# Patient Record
Sex: FEMALE | Race: WHITE
[De-identification: names, ages, dates, MRNs, and addresses within clinical notes are randomized per-mention and may not be internally consistent; named-entity substitution may affect disease eponyms.]

---

## 2022-07-26 ENCOUNTER — HOSPITAL ENCOUNTER (EMERGENCY)
Dept: HOSPITAL 97 - ER | Age: 42
Discharge: HOME | End: 2022-07-26
Payer: COMMERCIAL

## 2022-07-26 VITALS — OXYGEN SATURATION: 100 % | DIASTOLIC BLOOD PRESSURE: 70 MMHG | SYSTOLIC BLOOD PRESSURE: 117 MMHG

## 2022-07-26 VITALS — TEMPERATURE: 98.1 F

## 2022-07-26 DIAGNOSIS — S93.401A: Primary | ICD-10-CM

## 2022-07-26 DIAGNOSIS — I10: ICD-10-CM

## 2022-07-26 DIAGNOSIS — Z88.3: ICD-10-CM

## 2022-07-26 PROCEDURE — 99284 EMERGENCY DEPT VISIT MOD MDM: CPT

## 2022-07-26 PROCEDURE — 2W3QX1Z IMMOBILIZATION OF RIGHT LOWER LEG USING SPLINT: ICD-10-PCS

## 2022-07-26 NOTE — ER
Nurse's Notes                                                                                     

 [facility]                                                                 

Name: Karlie Zhou                                                                                

Age: 41 yrs                                                                                       

Sex: Female                                                                                       

: 1980                                                                                   

MRN: [id number]                                                                                   

Arrival Date: 2022                                                                          

Time: 14:07                                                                                       

Account#: [id number]                                                                            

Bed 9                                                                                             

Private MD:                                                                                       

Diagnosis: Sprain of unspecified ligament of right ankle                                          

                                                                                                  

Presentation:                                                                                     

                                                                                             

14:18 Chief complaint: Right ankle pain that radiates to calf since last night. Coronavirus   hb  

      screen: At this time, the client does not indicate any symptoms associated with             

      coronavirus-19. Ebola Screen: No symptoms or risks identified at this time. Initial         

      Sepsis Screen: Does the patient meet any 2 criteria? No. Patient's initial sepsis           

      screen is negative. Does the patient have a suspected source of infection? No.              

      Patient's initial sepsis screen is negative. Risk Assessment: Do you want to hurt           

      yourself or someone else? Patient reports no desire to harm self or others. Onset of        

      symptoms was 2022.                                                                 

14:18 Method Of Arrival: Ambulatory                                                           hb  

14:18 Acuity: ABELINO 4                                                                           hb  

                                                                                                  

Triage Assessment:                                                                                

14:20 General: Appears in no apparent distress. Behavior is calm, cooperative. Pain:. Neuro:  hb  

      Level of Consciousness is awake, alert, obeys commands, Oriented to person, place,          

      time, situation. Cardiovascular: Patient's skin is warm and dry. Respiratory:               

      Respiratory effort is even, unlabored, Respiratory pattern is regular, symmetrical.         

14:28 Musculoskeletal: Range of motion: intact in all extremities, Swelling present in right  eh3 

      ankle.                                                                                      

                                                                                                  

OB/GYN:                                                                                           

14:20 LMP 2022                                                                           hb  

                                                                                                  

Historical:                                                                                       

- Allergies:                                                                                      

14:20 Erythromycin;                                                                           hb  

- PMHx:                                                                                           

14:20 Hypertensive disorder;                                                                  hb  

                                                                                                  

- Immunization history:: Adult Immunizations up to date.                                          

- Social history:: Smoking status: Patient denies any tobacco usage or history of.                

                                                                                                  

                                                                                                  

Screenin:34 Abuse screen: Denies threats or abuse. Denies injuries from another. Nutritional        eh3 

      screening: No deficits noted. Tuberculosis screening: No symptoms or risk factors           

      identified. Fall Risk Gait- Impaired (20 pts.).                                             

                                                                                                  

Assessment:                                                                                       

14:34 Reassessment: No changes from previously documented assessment. See triage assessment.  eh3 

      General: Appears in no apparent distress. uncomfortable, Behavior is calm, cooperative,     

      appropriate for age. Pain: Complains of pain in right Achilles, right heel and right        

      ankle Pain radiates to right calf Pain currently is 7 out of 10 on a pain scale.            

      Quality of pain is described as burning, radiating, sharp, shooting, tender, Pain began     

      at 0200 today Is continuous, Alleviated by rest, repositioning, Aggravated by increased     

      activity, weight bearing. Neuro: Level of Consciousness is awake, alert, obeys              

      commands, Oriented to person, place, time, situation. Cardiovascular: Capillary refill      

      < 3 seconds Patient's skin is warm and dry. Respiratory: Airway is patent Respiratory       

      effort is even, unlabored. GI: No signs and/or symptoms were reported involving the         

      gastrointestinal system. : No signs and/or symptoms were reported regarding the           

      genitourinary system. EENT: No signs and/or symptoms were reported regarding the EENT       

      system. Derm: No signs and/or symptoms reported regarding the dermatologic system.          

      Musculoskeletal: Capillary refill < 3 seconds, in bilateral toes. Range of motion:          

      intact in all extremities, Swelling present in right ankle.                                 

                                                                                                  

Vital Signs:                                                                                      

14:18  / 97; Pulse 105; Resp 18; Temp 98.1; Pulse Ox 100% on R/A; Weight 112.04 kg;     hb  

      Height 5 ft. 5 in. (165.10 cm); Pain 7/10;                                                  

14:48  / 88; Pulse 90; Resp 18; Pulse Ox 99% on R/A; Height 5 ft. 9 in. (175.26 cm);    eh3 

      Pain 6/10;                                                                                  

17:34  / 85; Pulse 86; Resp 18; Pulse Ox 99% on R/A;                                    ld1 

18:26  / 70; Pulse 99; Resp 18; Pulse Ox 100% ;                                         ld1 

14:48 Body Mass Index 36.48 (112.04 kg, 175.26 cm)                                            eh3 

                                                                                                  

ED Course:                                                                                        

14:07 Patient arrived in ED.                                                                  mr  

14:10 Dinh Drummond NP is PHCP.                                                           pm1 

14:10 Jaylen Montgomery MD is Attending Physician.                                             pm1 

14:20 Triage completed.                                                                       hb  

14:20 Arm band placed on.                                                                     hb  

14:28 Graciela Davis is Primary Nurse.                                                            eh3 

14:34 Patient has correct armband on for positive identification. Bed in low position. Call   eh3 

      light in reach. Side rails up X2. Door closed. Noise minimized. Lights dimmed. Pillow       

      given.                                                                                      

15:17 Ankle Right 3 View XRAY In Process Unspecified.                                         EDMS

17:12 Ankle Right Wo Cont In Process Unspecified.                                             EDMS

18:26 No provider procedures requiring assistance completed. Patient did not have IV access   ld1 

      during this emergency room visit. Orthoglass splint: stirrup splint applied on left leg.    

                                                                                                  

Administered Medications:                                                                         

14:23 Drug: Norco (HYDROcodone-acetaminophen) 10 mg-325 mg 1 tabs Route: PO;                  hb  

17:34 Follow up: Response: No adverse reaction                                                ld1 

                                                                                                  

                                                                                                  

Medication:                                                                                       

14:50 VIS not applicable for this client.                                                     eh3 

                                                                                                  

Outcome:                                                                                          

17:48 Discharge ordered by MD.                                                                pm1 

18:26 Discharged to home via wheelchair.                                                      ld1 

18:26 Condition: stable                                                                           

18:26 Condition: stable                                                                           

18:26 Discharge instructions given to patient, Instructed on discharge instructions, follow       

      up and referral plans. medication usage, Demonstrated understanding of instructions,        

      follow-up care, medications, Prescriptions given X 1.                                       

18:27 Patient left the ED.                                                                    ld1 

                                                                                                  

Signatures:                                                                                       

Dispatcher [employer]                           EDMS                                                 

BruceBessy donald                                 mr DrummondDinh, NP                    NP   pm1                                                  

Swetha Cochran, RN                     RN                                                      

Ana María Gray RN                     RN   ld1                                                  

Graciela Davis                                   3                                                  

                                                                                                  

Corrections: (The following items were deleted from the chart)                                    

14:50 14:28 Musculoskeletal: Range of motion: eh3                                             eh3 

                                                                                                  

**************************************************************************************************

## 2022-07-26 NOTE — RAD REPORT
EXAM DESCRIPTION:  RAD - Ankle Right 3 View - 7/26/2022 3:15 pm

 

CLINICAL HISTORY:  PAIN

 

COMPARISON:  No comparisons

 

FINDINGS:  No fracture, dislocation or periosteal reaction. No joint effusion seen. Tibiotalar joint 
space is narrowed slightly. There degenerative changes along the articular margins of the tibiotalar 
joint space. No subtalar abnormality identifiable. Patient has large plantar and Achilles tendon spur
s off of the calcaneus.

 

Soft tissues around the ankle are prominent. No foreign body or air in the soft tissues.

 

IMPRESSION:  Right ankle degenerative change as detailed. No acute bone or joint finding.

 

Ankle soft tissue swelling.

## 2022-07-26 NOTE — RAD REPORT
EXAM DESCRIPTION:  MRI - Ankle Right Wo Cont - 7/26/2022 5:12 pm

 

CLINICAL HISTORY:  achilles pain

 

COMPARISON:  Ankle Right 3 View dated 7/26/2022

 

TECHNIQUE:  Multi planer imaging of the ankle using T1 weighted, proton density, T2 fat saturation an
d T2 stir sequencing.

 

FINDINGS:  No occult fracture, bone bruise or marrow replacing process. Tibiotalar joint space is liza
rowed slightly with marginal spurring. Subtalar ligament and subtalar joint space unremarkable. There
 is minimal fluid in the posterior aspect of the talocalcaneal joint space.

 

Achilles tendon shows no partial-thickness or full-thickness tear. No thickening, signal abnormality 
or adjacent abnormal fluid collection. No plantar abnormality seen.

 

The anterior and posterior tibiofibular and talofibular ligaments appear to be intact. No abnormal ed
ema in the adjacent soft tissues. Calcaneofibular ligament is intact. No evidence for medial ligament
 complex injury.

 

Tendons of the ankle show normal size and signal intensity. No skeletal muscle abnormality. No suspic
ious edema in the soft tissues.

 

IMPRESSION:  MRI of the ankle shows no acute or suspicious finding.

## 2022-07-26 NOTE — EDPHYS
Physician Documentation                                                                           

 [facility]                                                                 

Name: Karlie Zhou                                                                                

Age: 41 yrs                                                                                       

Sex: Female                                                                                       

: 1980                                                                                   

MRN: [id number]                                                                                   

Arrival Date: 2022                                                                          

Time: 14:07                                                                                       

Account#: [id number]                                                                            

Bed 9                                                                                             

Private MD:                                                                                       

ED Physician Jaylen Montgomery                                                                      

HPI:                                                                                              

                                                                                             

14:21 This 41 yrs old Female presents to ER via Ambulatory with complaints of Ankle Injury.   pm1 

14:21 The patient presents with pain, that is acute. The complaints affect the right          pm1 

      Achilles. Onset: The symptoms/episode began/occurred last night. Context: The problem       

      was sustained at home, resulted from the patient tripping, over a pet, The patient can      

      partially bear weight on the affected extremity. the patient is able to ambulate, with      

      moderate difficulty.                                                                        

14:21 Associated signs and symptoms: Pertinent negatives: calf tenderness, numbness,          pm1 

      swelling, tingling. Modifying factors: The symptoms are alleviated by nothing, the          

      symptoms are aggravated by nothing. Severity of symptoms: in the emergency department       

      the symptoms are unchanged. The patient has experienced a previous episode, Similar to      

      her Achilles tendon injury to other leg. The patient has not recently seen a physician.     

                                                                                                  

OB/GYN:                                                                                           

14:20 LMP 2022                                                                           hb  

                                                                                                  

Historical:                                                                                       

- Allergies:                                                                                      

14:20 Erythromycin;                                                                           hb  

- PMHx:                                                                                           

14:20 Hypertensive disorder;                                                                  hb  

                                                                                                  

- Immunization history:: Adult Immunizations up to date.                                          

- Social history:: Smoking status: Patient denies any tobacco usage or history of.                

                                                                                                  

                                                                                                  

ROS:                                                                                              

14:21 Constitutional: Negative for fever, chills, and weight loss, Cardiovascular: Negative   pm1 

      for chest pain, palpitations, and edema, Respiratory: Negative for shortness of breath,     

      cough, wheezing, and pleuritic chest pain.                                                  

14:21 Skin: Negative for injury, rash, and discoloration, Neuro: Negative for headache,           

      weakness, numbness, tingling, and seizure.                                                  

14:21 MS/extremity: Positive for pain, of the right Achilles.                                     

14:21 All other systems are negative.                                                             

                                                                                                  

Exam:                                                                                             

14:21 Constitutional:  This is a well developed, well nourished patient who is awake, alert,  pm1 

      and in no acute distress. Head/Face:  Normocephalic, atraumatic.                            

14:21 Skin:  Warm, dry with normal turgor.  Normal color with no rashes, no lesions, and no       

      evidence of cellulitis.                                                                     

14:21 Cardiovascular: Exam negative for  acute changes, Rate: normal, Rhythm: regular,            

      Pulses: no pulse deficits are appreciated.                                                  

14:21 Respiratory: Exam negative for  acute changes, respiratory distress, shortness of           

      breath, Breath sounds: are clear throughout.                                                

14:21 Musculoskeletal/extremity: Extremities: grossly normal except: noted in the right           

      Achilles: tenderness, There is no evidence of  decreased ROM, Negative Whatley test,       

      ROM: full active range of motion, in the right ankle, full passive range of motion, in      

      the right ankle.                                                                            

14:21 Neuro: Exam negative for acute changes, Orientation: is normal, Mentation: is normal,       

      Motor: is normal, moves all fours.                                                          

                                                                                                  

Vital Signs:                                                                                      

14:18  / 97; Pulse 105; Resp 18; Temp 98.1; Pulse Ox 100% on R/A; Weight 112.04 kg;     hb  

      Height 5 ft. 5 in. (165.10 cm); Pain 7/10;                                                  

14:48  / 88; Pulse 90; Resp 18; Pulse Ox 99% on R/A; Height 5 ft. 9 in. (175.26 cm);    eh3 

      Pain 6/10;                                                                                  

17:34  / 85; Pulse 86; Resp 18; Pulse Ox 99% on R/A;                                    ld1 

18:26  / 70; Pulse 99; Resp 18; Pulse Ox 100% ;                                         ld1 

14:48 Body Mass Index 36.48 (112.04 kg, 175.26 cm)                                            eh3 

                                                                                                  

MDM:                                                                                              

14:23 Patient medically screened.                                                             pm1 

17:46 Data reviewed: vital signs. Data interpreted: Pulse oximetry: on room air is 99 %.      pm1 

      Interpretation: normal. Counseling: I had a detailed discussion with the patient and/or     

      guardian regarding: the historical points, exam findings, and any diagnostic results        

      supporting the discharge/admit diagnosis, radiology results, the need for outpatient        

      follow up, to return to the emergency department if symptoms worsen or persist or if        

      there are any questions or concerns that arise at home.                                     

                                                                                                  

                                                                                             

14:20 Order name: Ankle Right 3 View XRAY; Complete Time: 15:28                               pm1 

                                                                                             

14:21 Order name: Crutches; Complete Time: 14:53                                              pm1 

                                                                                             

15:36 Order name: Ankle Right Wo Cont; Complete Time: 17:46                                   EDMS

                                                                                             

17:46 Order name: Splint - Ankle: Orthoglass: Belgicap; Complete Time: 18:26                   pm1 

                                                                                                  

Administered Medications:                                                                         

14:23 Drug: Norco (HYDROcodone-acetaminophen) 10 mg-325 mg 1 tabs Route: PO;                  hb  

17:34 Follow up: Response: No adverse reaction                                                ld1 

                                                                                                  

                                                                                                  

Disposition Summary:                                                                              

22 17:48                                                                                    

Discharge Ordered                                                                                 

      Location: Home                                                                          pm1 

      Problem: new                                                                            pm1 

      Symptoms: have improved                                                                 pm1 

      Condition: Stable                                                                       pm1 

      Diagnosis                                                                                   

        - Sprain of unspecified ligament of right ankle                                       pm1 

      Followup:                                                                               pm1 

        - With: Emergency Department                                                               

        - When: As needed                                                                          

        - Reason: Worsening of condition                                                           

      Followup:                                                                               pm1 

        - With: Private Physician                                                                  

        - When: 2 - 3 days                                                                         

        - Reason: Recheck today's complaints, Continuance of care, Re-evaluation by your           

      physician                                                                                   

      Discharge Instructions:                                                                     

        - Discharge Summary Sheet                                                             pm1 

        - Ankle Sprain                                                                        pm1 

        - Cast or Splint Care, Adult                                                          pm1 

        - Crutch Use, Adult                                                                   pm1 

      Forms:                                                                                      

        - Medication Reconciliation Form                                                      pm1 

        - Thank You Letter                                                                    pm1 

        - Antibiotic Education                                                                pm1 

        - Prescription Opioid Use                                                             pm1 

      Prescriptions:                                                                              

        - Tylenol-Codeine #3 300 mg-30 mg Oral                                                     

            - take 2 tablet by ORAL route every 6 hours As needed; 20 tablet; Refills: 0,     pm1 

      Product Selection Permitted                                                                 

Signatures:                                                                                       

Dispatcher [employer]                           EDMS                                                 

Dinh Drummond, NP                    NP   pm1                                                  

Swetha Cochran RN                     RN                                                      

Ana María Gray RN   ld1                                                  

                                                                                                  

Corrections: (The following items were deleted from the chart)                                    

14:14 14:11 IV Saline Lock ordered. pm1                                                       pm1 

14:16 14:12 Extremity Venous Uni Ltd+US.RAD.BRZ ordered. EDMS                                 EDMS

14:16 14:12 Ankle Left 3 View+RAD.RAD.BRZ ordered. EDMS                                       EDMS

14:16 14:12 Knee Right 3 View+RAD.RAD.BRZ ordered. EDMS                                       EDMS

14:18 14:12 CBC+H.LAB.BRZ ordered. EDMS                                                       EDMS

14:18 14:12 COMPREHENSIVE METABOLIC PANEL+C.LAB.BRZ ordered. EDMS                             EDMS

                                                                                                  

**************************************************************************************************

## 2022-07-26 NOTE — XMS REPORT
Continuity of Care Document

                            Created on:2022



Patient:STEFFEN BUENO

Sex:Female

:1980

External Reference #:[id number]





Demographics







                          Address                   [address] [place] [address]

 

                          Home Phone                [phone]

 

                          Mobile Phone              [phone]

 

                          Email Address             [email or web address]

 

                          Preferred Language        English

 

                          Marital Status            Unknown

 

                          Samaritan Affiliation     Unknown

 

                          Race                      White

 

                          Additional Race(s)        Unavailable

 

                          Ethnic Group               or 









Author







                          Organization              [facility]

t

 

                          Address                   [address]



                                                    [place] [address]

 

                          Phone                     [phone]









Support







                Name            Relationship    Address         Phone

 

                MYLES            X               [address]  [place]

[place] [address] 









Care Team Providers







                    Name                Role                Phone

 

                    MARIFER BELLO            Primary Care Physician Unavailable

 

                    Sridhar  Attending Clinician Unavailable

 

                    Lui Milian     Attending Clinician [phone]

 

                    RADIOLOGY           Attending Clinician Unavailable

 

                    Radiology           Attending Clinician Unavailable

 

                    PALMA             Attending Clinician Unavailable

 

                    Palma FNP         Attending Clinician [phone]

 

                    YOUNG BASSETT      Attending Clinician Unavailable

 

                    Jessica PRETTY            Attending Clinician Unavailable

 

                    MILTON                Attending Clinician Unavailable

 

                    Only,  Db Test      Attending Clinician Unavailable

 

                    Milton MORRIS             Attending Clinician [phone]

 

                    Ebrahim FNP         Attending Clinician [phone]

 

                    EBRAHIM             Attending Clinician Unavailable

 

                    Ibikunmartir FNP,  F    Attending Clinician [phone]

 

                    IBIKUNLE,  F        Attending Clinician Unavailable

 

                    UNKNOWN             Attending Clinician Unavailable

 

                    ARNIE US       Attending Clinician Unavailable

 

                    Young Bassett DO   Attending Clinician [phone]

 

                    Doctor Unassigned,  Name Attending Clinician Unavailable

 

                    ANENE               Attending Clinician Unavailable

 

                    ARNIE Us MD    Attending Clinician [phone]

 

                    Green FNP           Attending Clinician [phone]

 

                    Unknown             Attending Clinician Unavailable

 

                    Chung MORRIS,  E      Attending Clinician [phone]

 

                    Sridhar  Admitting Clinician Unavailable

 

                    LUI MILIAN     Admitting Clinician Unavailable

 

                    IRENA,  PHILOMENA        Admitting Clinician Unavailable









Payers







           Payer Name Policy Type Policy Number Effective Date Expiration Date S

jules

 

           [place]            [id number] 2020            



                                            00:00:00              

 

           [employer]            [id number] 2020            



           HMO                              00:00:00              

 

           [employer] TRS CARE            [id number] 



                                            00:00:00   00:00:00   







Problems







       Condition Condition Condition Status Onset  Resolution Last   Treating Co

mments 

Source



       Name   Details Category        Date   Date   Treatment Clinician        



                                                 Date                 

 

       Hypertensi Hypertensi Disease Active 2021                             U

nivers



       on     on                                                  ity of



                                   00:00:                             [place]



                                                                    [facility]



                                                                      Branch

 

       Acquired Acquired Disease Active 2017                             Unive

rs



       hypothyroi hypothyroi               0                               it

y of



       dism   dism                 00:00:                             [place]



                                   00                                 Medical



                                                                      Branch

 

       Anemia, Anemia, Disease Active 2017                             [facility]



       unspecifie unspecifie                                              it

y of



       d type d type               00:00:                             [place]



                                   00                                 Medical



                                                                      [place]







Allergies, Adverse Reactions, Alerts







       Allergy Allergy Status Severity Reaction(s) Onset  Inactive Treating Comm

ents 

Source



       Name   Type                        Date   Date   Clinician        

 

       Erythrom Propensi Active        Nausea 2016                      Univer

s



       ycin   ty to                and/or 0-14                        ity of



              adverse               Vomiting 00:00:                      [place]



              reaction                      00                          Medical



              s                                                       Branch

 

       ERYTHROM DRUG   Active        N/V    2016                      [facility]



       YCIN                               0-14                        ity of



                                          00:00:                      [place]



                                          00                          [facility]







Social History







           Social Habit Start Date Stop Date  Quantity   Comments   Source

 

           Alcohol Comment                       social                [facility]

 

           Exposure to 2022 Not sure              [facility]

 [place]



           SARS-CoV-2 (event) 00:00:00   10:10:00                         [facility]

l Branch

 

           Alcohol intake 2022 0 /d                  [facility] [place]



                      00:00:00   00:00:00                         [facility]

 

           Tobacco use and 2020 Never used            [facility]



           exposure   00:00:00   00:00:00                         [facility]

 

           Sex Assigned At 1980                       [facility]



           Birth      00:00:00   00:00:00                         [facility]









                Smoking Status  Start Date      Stop Date       Source

 

                Never smoker                                    [facility]







Medications







       Ordered Filled Start  Stop   Current Ordering Indication Dosage Frequency

 Signature

                    Comments            Components          Source



     Medication Medication Date Date Medication? Clinician                (SIG) 

          



     Name Name                                                   

 

     benzonatate      2021      Yes       [id number] 200mg      Take 2         

  [facility]



     (TESSALON      2-28                               capsules           ity of



     PERLES) 100      00:00:                               by mouth           [facility]

[place]



     mg capsule      00                                 every 8           Medica

l



                                                  (eight)           Branch



                                                  hours as           



                                                  needed for           



                                                  Cough.           

 

     benzonatate      2021      Yes       [id number] 200mg      Take 2         

  [facility]



     (TESSALON      2-28                               capsules           ity of



     PERLES) 100      00:00:                               by mouth           Te

[place]



     mg capsule      00                                 every 8           Medica

l



                                                  (eight)           Branch



                                                  hours as           



                                                  needed for           



                                                  Cough.           

 

     lisinopriL      2021      Yes                                     [facility]



     5 mg tablet      2-21                                              ity of



               00:00:                                              [place]



               00                                                Medical



                                                                 Branch

 

     lisinopriL      2021      Yes                                     [facility]



     5 mg tablet      2-21                                              ity of



               00:00:                                              [place]



               00                                                Medical



                                                                 Branch

 

     CALCIUM      2021      Yes                      Take  by           Univer

s



     CARBONATE/V      0-22                               mouth.           ity of



     ITAMIN D2      18:13:                                              [place]



     (CALCIUM +      35                                                Medical



     VITAMIN D                                                        Branch



     ORAL)                                                        

 

     MULTIVITAMI      2021      Yes                      Take  by           Un

karla



     N ORAL      0-22                               mouth.           ity of



               18:13:                                              [address]



                                                                 Branch

 

     IRON,      2021      Yes                      Take  by           Univers



     FERROUS      0-22                               mouth.           ity of



     SULFATE,      18:13:                                              [place]



     ORAL      35                                                Medical



                                                                 Branch

 

     Cholecalcif      2021      Yes                      Take  by           Un

karla



     kamla,      0-22                               mouth.           ity of



     Vitamin D3,      18:13:                                              [place]



     (VITAMIN      35                                                Medical



     D3) 50 mcg                                                        Branch



     (2,000                                                        



     unit)                                                        



     capsule                                                        

 

     CALCIUM      2021      Yes                      Take  by           Univer

s



     CARBONATE/V      0-22                               mouth.           ity of



     ITAMIN D2      18:13:                                              [place]



     (CALCIUM +      35                                                Medical



     VITAMIN D                                                        Branch



     ORAL)                                                        

 

     MULTIVITAMI      2021      Yes                      Take  by           Un

karla



     N ORAL      0-22                               mouth.           ity of



               18:13:                                              [address]



                                                                 Branch

 

     IRON,      2021      Yes                      Take  by           Univers



     FERROUS      0-22                               mouth.           ity of



     SULFATE,      18:13:                                              [place]



     ORAL      35                                                Medical



                                                                 Branch

 

     Cholecalcif      2021      Yes                      Take  by           Un

karla



     kamla,      0-22                               mouth.           ity of



     Vitamin D3,      18:13:                                              [place]



     (VITAMIN      35                                                Medical



     D3) 50 mcg                                                        Branch



     (2,000                                                        



     unit)                                                        



     capsule                                                        

 

     CALCIUM      2021      Yes                      Take  by           Univer

s



     CARBONATE/V      0-22                               mouth.           ity of



     ITAMIN D2      18:13:                                              [place]



     (CALCIUM +      35                                                Medical



     VITAMIN D                                                        Branch



     ORAL)                                                        

 

     MULTIVITAMI      2021      Yes                      Take  by           Un

karla



     N ORAL      0-22                               mouth.           ity of



               18:13:                                              [place]



               35                                                Medical



                                                                 Branch

 

     IRON,      2021      Yes                      Take  by           Univers



     FERROUS      0-22                               mouth.           ity of



     SULFATE,      18:13:                                              [place]



     ORAL      35                                                Medical



                                                                 Branch

 

     Cholecalcif      2021      Yes                      Take  by           Un

karla



     kamla,      0-22                               mouth.           ity of



     Vitamin D3,      18:13:                                              [place]



     (VITAMIN      35                                                Medical



     D3) 50 mcg                                                        Branch



     (2,000                                                        



     unit)                                                        



     capsule                                                        

 

     CALCIUM      2021      Yes                      Take  by           Univer

s



     CARBONATE/V      0-22                               mouth.           ity of



     ITAMIN D2      18:13:                                              [place]



     (CALCIUM +      35                                                Medical



     VITAMIN D                                                        Branch



     ORAL)                                                        

 

     MULTIVITAMI      2021      Yes                      Take  by           Un

karla



     N ORAL      0-22                               mouth.           ity of



               18:13:                                              [place]



               35                                                Medical



                                                                 Branch

 

     IRON,      2021      Yes                      Take  by           Univers



     FERROUS      0-22                               mouth.           ity of



     SULFATE,      18:13:                                              [place]



     ORAL      35                                                Medical



                                                                 Branch

 

     Cholecalcif      2021      Yes                      Take  by           Un

karla



     kamla,      0-22                               mouth.           ity of



     Vitamin D3,      18:13:                                              [place]



     (VITAMIN      35                                                Medical



     D3) 50 mcg                                                        Branch



     (2,000                                                        



     unit)                                                        



     capsule                                                        

 

     CALCIUM      2021      Yes                      Take  by           Univer

s



     CARBONATE/V      0-22                               mouth.           ity of



     ITAMIN D2      18:13:                                              [place]



     (CALCIUM +      35                                                Medical



     VITAMIN D                                                        Branch



     ORAL)                                                        

 

     MULTIVITAMI      2021      Yes                      Take  by           Un

karla



     N ORAL      0-22                               mouth.           ity of



               18:13:                                              [place]



               35                                                Medical



                                                                 Branch

 

     IRON,      2021      Yes                      Take  by           Univers



     FERROUS      0-22                               mouth.           ity of



     SULFATE,      18:13:                                              [place]



     ORAL      35                                                Medical



                                                                 Branch

 

     Cholecalcif      2021      Yes                      Take  by           Un

karla



     kamla,      0-22                               mouth.           ity of



     Vitamin D3,      18:13:                                              [place]



     (VITAMIN      35                                                Medical



     D3) 50 mcg                                                        Branch



     (2,000                                                        



     unit)                                                        



     capsule                                                        

 

     CALCIUM      2021      Yes                      Take  by           Univer

s



     CARBONATE/V      0-22                               mouth.           ity of



     ITAMIN D2      18:13:                                              [place]



     (CALCIUM +      35                                                Medical



     VITAMIN D                                                        Branch



     ORAL)                                                        

 

     MULTIVITAMI      2021      Yes                      Take  by           Un

karla



     N ORAL      0-22                               mouth.           ity of



               18:13:                                              [place]



               35                                                Medical



                                                                 Branch

 

     IRON,      2021      Yes                      Take  by           Univers



     FERROUS      0-22                               mouth.           ity of



     SULFATE,      18:13:                                              [place]



     ORAL      35                                                Medical



                                                                 Branch

 

     Cholecalcif      2021      Yes                      Take  by           Un

karla



     kamla,      0-22                               mouth.           ity of



     Vitamin D3,      18:13:                                              [place]



     (VITAMIN      35                                                Medical



     D3) 50 mcg                                                        Branch



     (2,000                                                        



     unit)                                                        



     capsule                                                        

 

     aspirin      2021-              325mg      325 mg,           Unive

rs



     E.C.      0-21 10-21                          Oral,           ity of



     (ECOTRIN)      16:45: 16:45                          ONCE, 1           [place]

s



     tablet 325      00   :00                           dose, On           Medic

al



     mg                                           Thu            Branch



                                                  10/21/21           



                                                  at 1145,           



                                                  STAT           

 

     Cholecalcif      2021      Yes                      Take  by           Un

karla



     kamla,      0-                               mouth.           ity of



     Vitamin D3,      09:50:                                              [place]



     (VITAMIN      09                                                Medical



     D3) 50 mcg                                                        Branch



     (2,000                                                        



     unit)                                                        



     capsule                                                        

 

     Cholecalcif      2020-0      Yes                      Take  by           Un

karla



     kamla,                                     mouth.           ity of



     Vitamin D3,      19:56:                                              [place]



     (VITAMIN      58                                                Medical



     D3) 50 mcg                                                        Branch



     (2,000                                                        



     unit)                                                        



     capsule                                                        

 

     Cholecalcif      2020-0      Yes                      Take  by           Un

karla



     kamla,                                     mouth.           ity of



     Vitamin D3,      19:56:                                              [place]



     (VITAMIN      58                                                Medical



     D3) 50 mcg                                                        Branch



     (2,000                                                        



     unit)                                                        



     capsule                                                        

 

     Cholecalcif      2020-0      Yes                      Take  by           Un

karla



     kamla,                                     mouth.           ity of



     Vitamin D3,      19:56:                                              [place]



     (VITAMIN      58                                                Medical



     D3) 50 mcg                                                        Branch



     (2,000                                                        



     unit)                                                        



     capsule                                                        

 

     Cholecalcif      2020-0      Yes                      Take  by           Un

karla



     kamla,                                     mouth.           ity of



     Vitamin D3,      19:56:                                              [place]



     (VITAMIN      58                                                Medical



     D3) 50 mcg                                                        Branch



     (2,000                                                        



     unit)                                                        



     capsule                                                        

 

     Cholecalcif      2020-0      Yes                      Take  by           Un

karla



     kamla,                                     mouth.           ity of



     Vitamin D3,      19:56:                                              [place]



     (VITAMIN      58                                                Medical



     D3) 50 mcg                                                        Branch



     (2,000                                                        



     unit)                                                        



     capsule                                                        

 

     Cholecalcif      2020-0      Yes                      Take  by           Un

karla



     kamla,      -                               mouth.           ity of



     Vitamin D3,      19:56:                                              [place]



     (VITAMIN      58                                                Medical



     D3) 50 mcg                                                        Branch



     (2,000                                                        



     unit)                                                        



     capsule                                                        

 

     Cholecalcif      2020-0      Yes                      Take  by           Un

karla



     kamla,      -                               mouth.           ity of



     Vitamin D3,      19:56:                                              [place]



     (VITAMIN      58                                                Medical



     D3) 50 mcg                                                        Branch



     (2,000                                                        



     unit)                                                        



     capsule                                                        

 

     CALCIUM      2020-0      Yes                      Take  by           [facility]

s



     CARBONATE/V      7-23                               mouth.           ity of



     ITAMIN D2      19:54:                                              [place]



     (CALCIUM +      08                                                Medical



     VITAMIN D                                                        Branch



     ORAL)                                                        

 

     CALCIUM      2020-0      Yes                      Take  by           [facility]

s



     CARBONATE/V      7-23                               mouth.           ity of



     ITAMIN D2      19:54:                                              [place]



     (CALCIUM +      08                                                Medical



     VITAMIN D                                                        Branch



     ORAL)                                                        

 

     CALCIUM      2020-0      Yes                      Take  by           [facility]

s



     CARBONATE/V      7-23                               mouth.           ity of



     ITAMIN D2      19:54:                                              [place]



     (CALCIUM +      08                                                Medical



     VITAMIN D                                                        Branch



     ORAL)                                                        

 

     CALCIUM      2020-0      Yes                      Take  by           [facility]

s



     CARBONATE/V      7-23                               mouth.           ity of



     ITAMIN D2      19:54:                                              [place]



     (CALCIUM +      08                                                Medical



     VITAMIN D                                                        Branch



     ORAL)                                                        

 

     CALCIUM      2020-0      Yes                      Take  by           [facility]

s



     CARBONATE/V      7-23                               mouth.           ity of



     ITAMIN D2      19:54:                                              [place]



     (CALCIUM +      08                                                Medical



     VITAMIN D                                                        Branch



     ORAL)                                                        

 

     CALCIUM      2020-0      Yes                      Take  by           [facility]

s



     CARBONATE/V      7-23                               mouth.           ity of



     ITAMIN D2      19:54:                                              [place]



     (CALCIUM +      08                                                Medical



     VITAMIN D                                                        Branch



     ORAL)                                                        

 

     CALCIUM      2020-0      Yes                      Take  by           [facility]

s



     CARBONATE/V      7-23                               mouth.           ity of



     ITAMIN D2      19:54:                                              [place]



     (CALCIUM +      08                                                Medical



     VITAMIN D                                                        Branch



     ORAL)                                                        

 

     MULTIVITAMI      2020-0      Yes                      Take  by           Un

karla



     N ORAL      7-23                               mouth.           ity of



               19:54:                                              [address]



                                                                 Branch

 

     MULTIVITAMI      2020-0      Yes                      Take  by           Un

karla



     N ORAL      7-23                               mouth.           ity of



               19:54:                                              [address]



                                                                 Branch

 

     MULTIVITAMI      2020-0      Yes                      Take  by           Un

karla



     N ORAL      7-23                               mouth.           ity of



               19:54:                                              [address]



                                                                 Branch

 

     MULTIVITAMI      2020-0      Yes                      Take  by           Un

karla



     N ORAL      7-23                               mouth.           ity of



               19:54:                                              [place]



               07                                                Medical



                                                                 Branch

 

     MULTIVITAMI      2020-0      Yes                      Take  by           Un

karla



     N ORAL      7-23                               mouth.           ity of



               19:54:                                              [place]



               07                                                Medical



                                                                 Branch

 

     MULTIVITAMI      2020-0      Yes                      Take  by           Un

karla



     N ORAL      7-23                               mouth.           ity of



               19:54:                                              [address]



                                                                 Branch

 

     MULTIVITAMI      2020-0      Yes                      Take  by           Un

karla



     N ORAL      7-23                               mouth.           ity of



               19:54:                                              [place]



               07                                                Medical



                                                                 Branch

 

     IRON,      2020-0      Yes                      Take  by           [facility]



     FERROUS      7-23                               mouth.           ity of



     SULFATE,      19:54:                                              [facility]      06                                                Medical



                                                                 Branch

 

     IRON,      2020-0      Yes                      Take  by           [facility]



     FERROUS      7-23                               mouth.           ity of



     SULFATE,      19:54:                                              [place]



     ORAL      06                                                Medical



                                                                 Branch

 

     IRON,      2020-0      Yes                      Take  by           [facility]



     FERROUS      7-23                               mouth.           ity of



     SULFATE,      19:54:                                              [place]



     ORAL      06                                                Medical



                                                                 Branch

 

     IRON,      2020-0      Yes                      Take  by           [facility]



     FERROUS      7-23                               mouth.           ity of



     SULFATE,      19:54:                                              [place]



     ORAL      06                                                Medical



                                                                 Branch

 

     IRON,      2020-0      Yes                      Take  by           [facility]



     FERROUS      7-23                               mouth.           ity of



     SULFATE,      19:54:                                              [place]



     ORAL      06                                                Medical



                                                                 Branch

 

     IRON,      2020-0      Yes                      Take  by           [facility]



     FERROUS      7-23                               mouth.           ity of



     SULFATE,      19:54:                                              [place]



     ORAL      06                                                Medical



                                                                 Branch

 

     IRON,      2020-0      Yes                      Take  by           [facility]



     FERROUS      7-23                               mouth.           ity of



     SULFATE,      19:54:                                              [place]



     ORAL      06                                                Medical



                                                                 Branch

 

     ZINC ORAL      2020-0      Yes                      Take  by           Univ

ers



               7-23                               mouth           ity of



               19:54:                               daily.           [place]



               05                                                Medical



                                                                 Branch

 

     ZINC ORAL      2020-0      Yes                      Take  by           Un[facility]

ers



               7-23                               mouth           ity of



               19:54:                               daily.           [place]



               05                                                Medical



                                                                 Branch

 

     ZINC ORAL      2020-0      Yes                      Take  by           Univ

ers



               7-23                               mouth           ity of



               19:54:                               daily.           [place]



               05                                                Medical



                                                                 Branch

 

     ZINC ORAL      2020-0      Yes                      Take  by           Univ

ers



               7-23                               mouth           ity of



               19:54:                               daily.           [place]



               05                                                Medical



                                                                 Branch

 

     ZINC ORAL      2020-0      Yes                      Take  by           Univ

ers



               7-23                               mouth           ity of



               19:54:                               daily.           [place]



               05                                                Medical



                                                                 Branch

 

     ZINC ORAL      2020-0      Yes                      Take  by           Univ

ers



               7-23                               mouth           ity of



               19:54:                               daily.           [place]



               05                                                Medical



                                                                 Branch

 

     ZINC ORAL      2020-0      Yes                      Take  by           Univ

ers



               7-23                               mouth           ity of



               19:54:                               daily.           [place]



               05                                                Medical



                                                                 Branch

 

     CALCIUM      2020-0      Yes                      [facility]  [facility]           [facility]

s



     CARBONATE/V      -23                               mouth.           ity of



     ITAMIN D2      14:54:                                              [place]



     (CALCIUM +      08                                                Medical



     VITAMIN D                                                        Branch



     ORAL)                                                        

 

     MULTIVITAMI      2020-0      Yes                      Take  by           Un

karla



     N ORAL      7-23                               mouth.           ity of



               14:54:                                              [place]



               07                                                Medical



                                                                 Branch

 

     IRON,      2020-0      Yes                      Take  by           [facility]



     FERROUS      7-23                               mouth.           ity of



     SULFATE,      14:54:                                              [place]



     ORAL      06                                                Medical



                                                                 Branch

 

     ZINC ORAL      2020-0      Yes                      Take  by           Univ

ers



               7-23                               mouth           ity of



               14:54:                               daily.           [place]



               05                                                Medical



                                                                 Branch

 

     ZINC ORAL      2020-0      Yes                      Take  by           Univ

ers



               7-23                               mouth           ity of



               14:54:                               daily.           [address]

 

     ZINC ORAL      2020-0      Yes                      Take  by           Univ

ers



               7-23                               mouth           ity of



               14:54:                               daily.           [address]

 

     ZINC ORAL      2020-0      Yes                      Take  by           Univ

ers



               7-23                               mouth           ity of



               14:54:                               daily.           [address]

 

     ZINC ORAL      2020-0      Yes                      Take  by           Univ

ers



               7-23                               mouth           ity of



               14:54:                               daily.           [address]

 

     ZINC ORAL      2020-0      Yes                      Take  by           Univ

ers



               7-23                               mouth           ity of



               14:54:                               daily.           [address]

 

     ZINC ORAL      2020-0      Yes                      Take  by           Univ

ers



               7-23                               mouth           ity of



               14:54:                               daily.           [address]

 

     amoxicillin      - 2020- No        [id number] 500mg      Take 1         

  [facility]



     500 mg      -16 -                          capsule by           ity of



     capsule      00:00: 05:59                          mouth 2           [place]



               00   :00                           (two)           Medical



                                                  times           [place]



                                                  daily for           



                                                  10 days.           

 

     amoxicillin      - 2020- No        [id number] 500mg      Take 1         

  [facility]



     500 mg      -                          capsule by           ity of



     capsule      00:00: 05:59                          mouth 2           [place]



               00   :00                           (two)           Medical



                                                  times           [place]



                                                  daily for           



                                                  10 days.           

 

     amoxicillin      - 2020- No        [id number] 500mg      Take 1         

  [facility]



     500 mg      -16                           capsule by           ity of



     capsule      00:00: 05:59                          mouth 2           [place]



               00   :00                           (two)           Medical



                                                  times           [place]



                                                  daily for           



                                                  10 days.           

 

     CALCIUM      2019      Yes                      Take  by           [facility]

s



     CARBONATE/V      2-31                               mouth.           ity of



     ITAMIN D2      14:03:                                              [place]



     (CALCIUM +      39                                                Medical



     VITAMIN D                                                        Branch



     ORAL)                                                        

 

     IRON,      2019      Yes                      Take  by           [facility]



     FERROUS      2-31                               mouth.           ity of



     SULFATE,      14:03:                                              [address]

 

     CALCIUM      2019      Yes                      Take  by           [facility]

s



     CARBONATE/V      2-31                               mouth.           ity of



     ITAMIN D2      14:03:                                              [place]



     (CALCIUM +      39                                                Medical



     VITAMIN D                                                        Branch



     ORAL)                                                        

 

     IRON,      2019      Yes                      Take  by           [facility]



     FERROUS      2-31                               mouth.           ity of



     SULFATE,      14:03:                                              [address]

 

     CALCIUM      -      Yes                      Take  by           [facility]

s



     CARBONATE/V      2-31                               mouth.           ity of



     ITAMIN D2      14:03:                                              [place]



     (CALCIUM +      39                                                Medical



     VITAMIN D                                                        Branch



     ORAL)                                                        

 

     IRON,      2019      Yes                      Take  by           [facility]



     FERROUS      2-31                               mouth.           ity of



     SULFATE,      14:03:                                              [address]

 

     CALCIUM      -      Yes                      Take  by           [facility]

s



     CARBONATE/V      2-31                               mouth.           ity of



     ITAMIN D2      14:03:                                              [place]



     (CALCIUM +      39                                                Medical



     VITAMIN D                                                        Branch



     ORAL)                                                        

 

     IRON,      2019      Yes                      Take  by           [facility]



     FERROUS      2-31                               mouth.           ity of



     SULFATE,      14:03:                                              [place] [address]                                                Medical



                                                                 Branch

 

     CALCIUM      2019      Yes                      Take  by           [facility]

s



     CARBONATE/V      2-31                               mouth.           ity of



     ITAMIN D2      14:03:                                              [place]



     (CALCIUM +      39                                                Medical



     VITAMIN D                                                        Branch



     ORAL)                                                        

 

     IRON,      2019      Yes                      Take  by           [facility]



     FERROUS      2-31                               mouth.           ity of



     SULFATE,      14:03:                                              [place]



     ORAL                                                      Medical



                                                                 Branch

 

     CALCIUM      -      Yes                      Take  by           [facility]

s



     CARBONATE/V      2-31                               mouth.           ity of



     ITAMIN D2      14:03:                                              [place]



     (CALCIUM +      39                                                Medical



     VITAMIN D                                                        Branch



     ORAL)                                                        

 

     IRON,      2019      Yes                      Take  by           [facility]



     FERROUS      2-31                               mouth.           ity of



     SULFATE,      14:03:                                              [place] [address]                                                Medical



                                                                 Branch

 

     CALCIUM      2019      Yes                      Take  by           [facility]

s



     CARBONATE/V      2-31                               mouth.           ity of



     ITAMIN D2      14:03:                                              [place]



     (CALCIUM +      39                                                Medical



     VITAMIN D                                                        Branch



     ORAL)                                                        

 

     IRON,      2019      Yes                      Take  by           Univers



     FERROUS      2-31                               mouth.           ity of



     SULFATE,      14:03:                                              [place] [address]                                                Medical



                                                                 Branch

 

     CALCIUM      -      Yes                      Take  by           Univer

s



     CARBONATE/V      2-31                               mouth.           ity of



     ITAMIN D2      14:03:                                              [place]



     (CALCIUM +      39                                                Medical



     VITAMIN D                                                        Branch



     ORAL)                                                        

 

     IRON,      2019      Yes                      Take  by           [facility]



     FERROUS      2-31                               mouth.           ity of



     SULFATE,      14:03:                                              [place] [address]                                                Medical



                                                                 Branch

 

     CALCIUM      -      Yes                      Take  by           Univer

s



     CARBONATE/V      2-31                               mouth.           ity of



     ITAMIN D2      14:03:                                              [place]



     (CALCIUM +      39                                                Medical



     VITAMIN D                                                        Branch



     ORAL)                                                        

 

     IRON,      2019      Yes                      Take  by           [facility]



     FERROUS      2-31                               mouth.           ity of



     SULFATE,      14:03:                                              [place] [address]                                                Medical



                                                                 Branch

 

     MULTIVITAMI      2019-      Yes                      Take  by           Un

[facility]



     N ORAL      2-31                               mouth.           ity of



               13:55:                                              [place]



               39                                                Medical



                                                                 Branch

 

     MULTIVITAMI      2019      Yes                      Take  by           Un

[facility]



     N ORAL      2-31                               mouth.           ity of



               13:55:                                              [place]



               39                                                Medical



                                                                 Branch

 

     MULTIVITAMI      2019      Yes                      Take  by           Un

[facility]la



     N ORAL      2-31                               mouth.           ity of



               13:55:                                              [place]



               39                                                Medical



                                                                 Branch

 

     MULTIVITAMI      2019      Yes                      Take  by           Un

[facility]



     N ORAL      2-31                               mouth.           ity of



               13:55:                                              [place]



               39                                                Medical



                                                                 Branch

 

     MULTIVITAMI      2019      Yes                      Take  by           Un

[facility]la



     N ORAL      2-31                               mouth.           ity of



               13:55:                                              [place]



               39                                                Medical



                                                                 Branch

 

     MULTIVITAMI      2019      Yes                      Take  by           Un

[facility]



     N ORAL      2-31                               mouth.           ity of



               13:55:                                              [place]



               39                                                Medical



                                                                 Branch

 

     MULTIVITAMI      2019      Yes                      Take  by           Un

[facility]



     N ORAL      2-31                               mouth.           ity of



               13:55:                                              [place]



               39                                                Medical



                                                                 Branch

 

     MULTIVITAMI      2019      Yes                      Take  by           Un

[facility]



     N ORAL      2-31                               mouth.           ity of



               13:55:                                              [place]



               39                                                Medical



                                                                 Branch

 

     MULTIVITAMI      2019      Yes                      Take  by           Un

karla



     N ORAL      2-31                               mouth.           ity of



               13:55:                                              [place]



               39                                                Medical



                                                                 Branch

 

     fluocinonid      2019      Yes       [id number]           Apply  to       

    [facility]



     e 0.05 %      0-23                               area(s) 2           ity of



     cream      00:00:                               (two)           [place]



               00                                 times           Medical



                                                  daily.           Branch

 

     fluocinonid      -      Yes       [id number]           Apply  to       

    [facility]



     e 0.05 %      0-23                               area(s) 2           ity of



     cream      00:00:                               (two)           [place]



               00                                 times           Medical



                                                  daily.           Branch

 

     fluocinonid      -      Yes       [id number]           Apply  to       

    [facility]



     e 0.05 %      0-23                               area(s) 2           ity of



     cream      00:00:                               (two)           [place]



               00                                 times           Medical



                                                  daily.           Branch

 

     fluocinonid      -      Yes       [id number]           Apply  to       

    [facility]



     e 0.05 %      0-23                               area(s) 2           ity of



     cream      00:00:                               (two)           [place]



               00                                 times           Medical



                                                  daily.           Branch

 

     fluocinonid      -      Yes       [id number]           Apply  to       

    [facility]



     e 0.05 %      0-23                               area(s) 2           ity of



     cream      00:00:                               (two)           [place]



               00                                 times           Medical



                                                  daily.           Branch

 

     fluocinonid      -      Yes       [id number]           Apply  to       

    [facility]



     e 0.05 %      0-23                               area(s) 2           ity of



     cream      00:00:                               (two)           [place]



               00                                 times           Medical



                                                  daily.           Branch

 

     fluocinonid      -      Yes       [id number]           Apply  to       

    [facility]



     e 0.05 %      0-23                               area(s) 2           ity of



     cream      00:00:                               (two)           [place]



               00                                 times           Medical



                                                  daily.           Branch

 

     fluocinonid      2019      Yes       [id number]           Apply  to       

    [facility]



     e 0.05 %      0-23                               area(s) 2           ity of



     cream      00:00:                               (two)           [place]



               00                                 times           Medical



                                                  daily.           Branch

 

     fluocinonid      -      Yes       [id number]           Apply  to       

    [facility]



     e 0.05 %      0-23                               area(s) 2           ity of



     cream      00:00:                               (two)           [place]



               00                                 times           Medical



                                                  daily.           Branch

 

     fluocinonid      2019 2020- No        [id number]           Apply  to      

     Univers



     e 0.05 %      0-23 07-23                          area(s) 2           ity o

f



     cream      00:00: 00:00                          (two)           [place]



               00   :00                           times           Medical



                                                  daily.           Branch

 

     acetaminoph      -      Yes       [id number]                     

 [facility]



     en-codeine      8-03                               tab Every           ity 

of



     300-30 mg      00:00:                               4hrs as           [place]



     tablet      00                                 needed for           Medical



                                                  pain or           Branch



                                                  cough           



                                                  requiring           



                                                  narcotic           

 

     levothyroxi            Yes       [id number] 175ug      Take 1         

  [facility]



     ne        6-25                               tablet by           ity of



     (SYNTHROID)      00:00:                               mouth           [place]



     175 mcg      00                                 every           Medical



     tablet                                         morning.           Branch

 

     levothyroxi            Yes       [id number] 175ug      Take 1         

  [facility]



     ne        6-25                               tablet by           ity of



     (SYNTHROID)      00:00:                               mouth           [place]



     175 mcg      00                                 every           Medical



     tablet                                         morning.           Branch

 

     levothyroxi            Yes       [id number] 175ug      Take 1         

  [facility]



     ne        6-25                               tablet by           ity of



     (SYNTHROID)      00:00:                               mouth           [place]



     175 mcg      00                                 every           Medical



     tablet                                         morning.           Branch

 

     levothyroxi      -      Yes       [id number] 175ug      Take 1         

  [facility]



     ne        6-25                               tablet by           ity of



     (SYNTHROID)      00:00:                               mouth           [place]



     175 mcg      00                                 every           Medical



     tablet                                         morning.           Branch

 

     levothyroxi            Yes       [id number] 175ug      Take 1         

  [facility]



     ne        6-25                               tablet by           ity of



     (SYNTHROID)      00:00:                               mouth           [place]



     175 mcg      00                                 every           Medical



     tablet                                         morning.           Branch

 

     levothyroxi      -      Yes       [id number] 175ug      Take 1         

  [facility]



     ne        6-25                               tablet by           ity of



     (SYNTHROID)      00:00:                               mouth           [place]



     175 mcg      00                                 every           Medical



     tablet                                         morning.           Branch

 

     levothyroxi            Yes       [id number] 175ug      Take 1         

  [facility]



     ne        6-25                               tablet by           ity of



     (SYNTHROID)      00:00:                               mouth           [place]



     175 mcg      00                                 every           Medical



     tablet                                         morning.           Branch

 

     levothyroxi      2019-0      Yes       [id number] 175ug      Take 1         

  [facility]



     ne        6-25                               tablet by           ity of



     (SYNTHROID)      00:00:                               mouth           [place]



     175 mcg      00                                 every           Medical



     tablet                                         morning.           Branch

 

     levothyroxi      2019-0      Yes       [id number] 175ug      Take 1         

  [facility]



     ne        6-25                               tablet by           ity of



     (SYNTHROID)      00:00:                               mouth           [place]



     175 mcg      00                                 every           Medical



     tablet                                         morning.           Branch

 

     levothyroxi      2019-0      Yes       [id number] 175ug      Take 1         

  [facility]



     ne        6-25                               tablet by           ity of



     (SYNTHROID)      00:00:                               mouth           [place]



     175 mcg      00                                 every           Medical



     tablet                                         morning.           Branch

 

     levothyroxi      2019-0      Yes       [id number] 175ug      Take 1         

  [facility]



     ne        6-25                               tablet by           ity of



     (SYNTHROID)      00:00:                               mouth           [place]



     175 mcg      00                                 every           Medical



     tablet                                         morning.           Branch

 

     levothyroxi      2019-0      Yes       [id number] 175ug      Take 1         

  [facility]



     ne        6-25                               tablet by           ity of



     (SYNTHROID)      00:00:                               mouth           [place]



     175 mcg      00                                 every           Medical



     tablet                                         morning.           Branch

 

     levothyroxi      2019-0      Yes       [id number] 175ug      Take 1         

  [facility]



     ne        6-25                               tablet by           ity of



     (SYNTHROID)      00:00:                               mouth           [place]



     175 mcg      00                                 every           Medical



     tablet                                         morning.           Branch

 

     levothyroxi      2019-0      Yes       [id number] 175ug      Take 1         

  [facility]



     ne        6-25                               tablet by           ity of



     (SYNTHROID)      00:00:                               mouth           [place]



     175 mcg      00                                 every           Medical



     tablet                                         morning.           Branch

 

     levothyroxi      2019-0      Yes       [id number] 175ug      Take 1         

  [facility]



     ne        6-25                               tablet by           ity of



     (SYNTHROID)      00:00:                               mouth           [place]



     175 mcg      00                                 every           Medical



     tablet                                         morning.           Branch

 

     levothyroxi      2019-0      Yes       [id number] 175ug      Take 1         

  [facility]



     ne        6-25                               tablet by           ity of



     (SYNTHROID)      00:00:                               mouth           [place]



     175 mcg      00                                 every           Medical



     tablet                                         morning.           Branch

 

     levothyroxi      2019-0      Yes       [id number] 175ug      Take 1         

  [facility]



     ne        6-25                               tablet by           ity of



     (SYNTHROID)      00:00:                               mouth           [place]



     175 mcg      00                                 every           Medical



     tablet                                         morning.           Branch

 

     levothyroxi      2019-0      Yes       [id number] 175ug      Take 1         

  [facility]



     ne        6-25                               tablet by           ity of



     (SYNTHROID)      00:00:                               mouth           [place]



     175 mcg      00                                 every           Medical



     tablet                                         morning.           Branch

 

     levothyroxi      2019-0      Yes       [id number] 175ug      Take 1         

  [facility]



     ne        6-25                               tablet by           ity of



     (SYNTHROID)      00:00:                               mouth           [place]



     175 mcg      00                                 every           Medical



     tablet                                         morning.           Branch

 

     levothyroxi      2019-0      Yes       [id number] 175ug      Take 1         

  [facility]



     ne        6-25                               tablet by           ity of



     (SYNTHROID)      00:00:                               mouth           [place]



     175 mcg      00                                 every           Medical



     tablet                                         morning.           Branch

 

     levothyroxi      2019-0      Yes       [id number] 175ug      Take 1         

  [facility]



     ne        6-25                               tablet by           ity of



     (SYNTHROID)      00:00:                               mouth           [place]



     175 mcg      00                                 every           Medical



     tablet                                         morning.           Branch

 

     levothyroxi      2019-0      Yes       [id number] 175ug      Take 1         

  [facility]



     ne        6-25                               tablet by           ity of



     (SYNTHROID)      00:00:                               mouth           [place]



     175 mcg      00                                 every           Medical



     tablet                                         morning.           Branch

 

     levothyroxi      2019-0      Yes       [id number] 175ug      Take 1         

  [facility]



     ne        6-25                               tablet by           ity of



     (SYNTHROID)      00:00:                               mouth           [place]



     175 mcg      00                                 every           Medical



     tablet                                         morning.           Branch

 

     levothyroxi      2019-0      Yes       [id number] 175ug      Take 1         

  [facility]



     ne        6-25                               tablet by           ity of



     (SYNTHROID)      00:00:                               mouth           [place]



     175 mcg      00                                 every           Medical



     tablet                                         morning.           [place]

 

     ZINC ORAL      2019-0      Yes                      Take  by           Univ

ers



               5-04                               mouth           ity of



               15:30:                               daily.           [address]



                                                                 Branch

 

     ZINC ORAL      2019-0      Yes                      Take  by           Univ

ers



               5-04                               mouth           ity of



               15:30:                               daily.           [address]



                                                                 Branch

 

     ZINC ORAL      2019-0      Yes                      Take  by           Univ

ers



               5-04                               mouth           ity of



               15:30:                               daily.           [address]

 

     ZINC ORAL      2019-0      Yes                      Take  by           Univ

ers



               5-04                               mouth           ity of



               15:30:                               daily.           [address]



                                                                 Branch

 

     CALCIUM      2019-0      Yes                      Take  by           [facility]

s



     CARBONATE/V      5-04                               mouth.           ity of



     ITAMIN D2      15:30:                                              [place]



     (CALCIUM +      34                                                Medical



     VITAMIN D                                                        Branch



     ORAL)                                                        

 

     MULTIVITAMI      2019-0      Yes                      Take  by           

[facility]



     N ORAL      5-04                               mouth.           ity of



               15:30:                                              [address]



                                                                 Branch

 

     IRON,      2019-0      Yes                      Take  by           [facility]



     FERROUS      5-04                               mouth.           ity of



     SULFATE,      15:30:                                              [address]



                                                                 Branch

 

     ZINC ORAL      2019-0      Yes                      Take  by           Univ

ers



               5-04                               mouth           ity of



               15:30:                               daily.           [address]

 

     ZINC ORAL      2019-0      Yes                      Take  by           Univ

ers



               5-04                               mouth           ity of



               15:30:                               daily.           [address]

 

     ZINC ORAL      2019-0      Yes                      Take  by           Univ

ers



               5-04                               mouth           ity of



               15:30:                               daily.           [address]

 

     ZINC ORAL      2019-0      Yes                      Take  by           Univ

ers



               5-04                               mouth           ity of



               15:30:                               daily.           [address]

 

     ZINC ORAL      2019-0      Yes                      Take  by           Univ

ers



               5-04                               mouth           ity of



               15:30:                               daily.           [address]

 

     ZINC ORAL      2019-0      Yes                      Take  by           Univ

ers



               5-04                               mouth           ity of



               15:30:                               daily.           [address]

 

     acetaminoph      2019-0      Yes       [id number]                     

 Univers



     en-codeine      5-04                               tab Every           ity 

of



     300-30 mg      00:00:                               4hrs as           [place]



     tablet      00                                 needed for           Medical



                                                  pain or           Branch



                                                  cough           



                                                  requiring           



                                                  narcotic           







Immunizations







           Ordered    Filled Immunization Date       Status     Comments   [facility]

e



           Immunization Name Name                                        

 

           Influenza Virus            2018-10-22 Completed             Universit

y of



           Vaccine               00:00:00                         [facility]

 

           Influenza Virus            2018-10-22 Completed             Universit

y of



           Vaccine               00:00:00                         [facility]

 

           Influenza Virus            2018-10-22 Completed             Universit

y of



           Vaccine               00:00:00                         [facility]

 

           Influenza Virus            2018-10-22 Completed             Universit

y of



           Vaccine               00:00:00                         [facility]

 

           Influenza Virus            2018-10-22 Completed             Universit

y of



           Vaccine               00:00:00                         [facility]

 

           Influenza Virus            2018-10-22 Completed             Universit

y of



           Vaccine               00:00:00                         [facility]

 

           Influenza Virus            2018-10-22 Completed             Universit

y of



           Vaccine               00:00:00                         [facility]

 

           Influenza Virus            2018-10-22 Completed             Universit

y of



           Vaccine               00:00:00                         [facility]

 

           Influenza Virus            2018-10-22 Completed             Universit

y of



           Vaccine               00:00:00                         [facility]

 

           Influenza Virus            2018-10-22 Completed             Universit

y of



           Vaccine               00:00:00                         [facility]

 

           Influenza Virus            2018-10-22 Completed             Universit

y of



           Vaccine               00:00:00                         [facility]

 

           Influenza Virus            2018-10-22 Completed             Universit

y of



           Vaccine               00:00:00                         [facility]

 

           Influenza Virus            2018-10-22 Completed             Universit

y of



           Vaccine               00:00:00                         [facility]

 

           Influenza Virus            2018-10-22 Completed             Universit

y of



           Vaccine               00:00:00                         [facility]

 

           Influenza Virus            2018-10-22 Completed             Universit

y of



           Vaccine               00:00:00                         [facility]

 

           Influenza Virus            2018-10-22 Completed             Universit

y of



           Vaccine               00:00:00                         [facility]

 

           Influenza Virus            2018-10-22 Completed             Universit

y of



           Vaccine               00:00:00                         [facility]

 

           Influenza Virus            2018-10-22 Completed             Universit

y of



           Vaccine               00:00:00                         [facility]

 

           Influenza Virus            2018-10-22 Completed             Universit

y of



           Vaccine               00:00:00                         [facility]

 

           Influenza Virus            2018-10-22 Completed             Universit

y of



           Vaccine               00:00:00                         [facility]

 

           Influenza Virus            2018-10-22 Completed             Universit

y of



           Vaccine               00:00:00                         [facility]

 

           Influenza Virus            2018-10-22 Completed             Universit

y of



           Vaccine               00:00:00                         [facility]

 

           Influenza Virus            2018-10-22 Completed             Universit

y of



           Vaccine               00:00:00                         [facility]

 

           Influenza Virus            2018-10-22 Completed             Universit

y of



           Vaccine               00:00:00                         [facility]

 

           Influenza Virus            2017-10-27 Completed             Universit

y of



           Vaccine Quad IM 3+            00:00:00                         [facility]

 

           Influenza Virus            2017-10-27 Completed             Universit

y of



           Vaccine Quad IM 3+            00:00:00                         [facility]

 

           Influenza Virus            2017-10-27 Completed             Universit

y of



           Vaccine Quad IM 3+            00:00:00                         [facility]

 

           Influenza Virus            2017-10-27 Completed             Universit

y of



           Vaccine Quad IM 3+            00:00:00                         [facility]

 

           Influenza Virus            2017-10-27 Completed             Universit

y of



           Vaccine Quad IM 3+            00:00:00                         [facility]

 

           Influenza Virus            2017-10-27 Completed             Universit

y of



           Vaccine Quad IM 3+            00:00:00                         [facility]

 

           Influenza Virus            2017-10-27 Completed             Universit

y of



           Vaccine Quad IM 3+            00:00:00                         [facility]

 

           Influenza Virus            2017-10-27 Completed             Universit

y of



           Vaccine Quad IM 3+            00:00:00                         [facility]

 

           Influenza Virus            2017-10-27 Completed             Universit

y of



           Vaccine Quad IM 3+            00:00:00                         [facility]

 

           Influenza Virus            2017-10-27 Completed             Universit

y of



           Vaccine Quad IM 3+            00:00:00                         [facility]

 

           Influenza Virus            2017-10-27 Completed             Universit

y of



           Vaccine Quad IM 3+            00:00:00                         [facility]

 

           Influenza Virus            2017-10-27 Completed             Universit

y of



           Vaccine Quad IM 3+            00:00:00                         [facility]

 

           Influenza Virus            2017-10-27 Completed             Universit

y of



           Vaccine Quad IM 3+            00:00:00                         [facility]

 

           Influenza Virus            2017-10-27 Completed             Universit

y of



           Vaccine Quad IM 3+            00:00:00                         [facility]

 

           Influenza Virus            2017-10-27 Completed             Universit

y of



           Vaccine Quad IM 3+            00:00:00                         [facility]

 

           Influenza Virus            2017-10-27 Completed             Universit

y of



           Vaccine Quad IM 3+            00:00:00                         [facility]

 

           Influenza Virus            2017-10-27 Completed             Universit

y of



           Vaccine Quad IM 3+            00:00:00                         [facility]

 

           Influenza Virus            2017-10-27 Completed             Universit

y of



           Vaccine Quad IM 3+            00:00:00                         [facility]

 

           Influenza Virus            2017-10-27 Completed             Universit

y of



           Vaccine Quad IM 3+            00:00:00                         [facility]

 

           Influenza Virus            2017-10-27 Completed             Universit

y of



           Vaccine Quad IM 3+            00:00:00                         [facility]

 

           Influenza Virus            2017-10-27 Completed             Universit

y of



           Vaccine Quad IM 3+            00:00:00                         [facility]

 

           Influenza Virus            2017-10-27 Completed             Universit

y of



           Vaccine Quad IM 3+            00:00:00                         [facility]

 

           Influenza Virus            2017-10-27 Completed             Universit

y of



           Vaccine Quad IM 3+            00:00:00                         [facility]

 

           Influenza Virus            2017-10-27 Completed             Universit

y of



           Vaccine Quad IM 3+            00:00:00                         [facility]







Vital Signs







             Vital Name   Observation Time Observation Value Comments     Source

 

             Systolic blood 2021 21:19:00 136 mm[Hg]                Univer

sity of



             pressure                                            [facility]

 

             Diastolic blood 2021 21:19:00 90 mm[Hg]                 Unive

rsity of



             pressure                                            [facility]

 

             Heart rate   2021 21:19:00 106 /min                  [facility]

 

             Body temperature 2021 21:19:00 36.28 Maria Teresa                 Univ

ersity of



                                                                 [facility]

 

             Respiratory [facility] 2021 21:19:00 18 /min                   [facility]

 

             Body height  2021 21:19:00 167.6 cm                  Universi

ty of



                                                                 [facility]



                                                                 Branch

 

             Body weight  2021 21:19:00 112.719 kg                Universi

ty of



                                                                 [facility]



                                                                 Branch

 

             BMI          2021 21:19:00 40.11 kg/m2               Universi

ty of



                                                                 [facility]



                                                                 Branch

 

             Oxygen saturation in 2021 21:19:00 97 /min                   

[facility]



             Arterial blood by                                        [place] [facility]

renita



             Pulse oximetry                                        Branch

 

             Systolic blood 2021-10-22 23:15:00 134 mm[Hg]                Univer

sity of



             pressure                                            [facility]



                                                                 Branch

 

             Diastolic blood 2021-10-22 23:15:00 89 mm[Hg]                 Unive

rsity of



             pressure                                            [facility]



                                                                 Branch

 

             Heart rate   2021-10-22 23:12:00 95 /min                   Universi

ty of



                                                                 [facility]



                                                                 Branch

 

             Body temperature 2021-10-22 23:12:00 36.67 Maria Teresa                 Univ

ersity of



                                                                 [facility]



                                                                 Branch

 

             Respiratory rate 2021-10-22 23:12:00 18 /min                   Univ

ersity of



                                                                 [facility]



                                                                 Branch

 

             Body height  2021-10-22 23:12:00 167.6 cm                  Universi

ty of



                                                                 [facility]



                                                                 Branch

 

             Body weight  2021-10-22 23:12:00 111.54 kg                 Universi

ty of



                                                                 [place] [facility]



                                                                 Branch

 

             BMI          2021-10-22 23:12:00 39.69 kg/m2               Universi

ty of



                                                                 [place] [facility]



                                                                 Branch

 

             Oxygen saturation in 2021-10-22 23:12:00 98 /min                   

[facility]



             Arterial blood by                                        [place] [facility]

renita



             Pulse oximetry                                        Branch

 

             Systolic blood 2021-10-21 17:51:12 134 mm[Hg]                Univer

sity of



             pressure                                            [facility]



                                                                 Branch

 

             Diastolic blood 2021-10-21 17:51:12 97 mm[Hg]                 Unive

rsity of



             pressure                                            [facility]



                                                                 Branch

 

             Heart rate   2021-10-21 17:51:12 84 /min                   Universi

ty of



                                                                 [facility]



                                                                 Branch

 

             Respiratory rate 2021-10-21 17:51:12 15 /min                   Univ

ersity of



                                                                 [place] [facility]



                                                                 Branch

 

             Oxygen saturation in 2021-10-21 17:51:12 98 /min                   

[facility]



             Arterial blood by                                        [place] [facility]

renita



             Pulse oximetry                                        Branch

 

             Body temperature 2021-10-21 14:53:58 36.78 Maria Teresa                 Univ

ersity of



                                                                 [facility]



                                                                 Branch

 

             Body weight  2021-10-21 14:50:00 110.678 kg                Universi

ty of



                                                                 [facility]



                                                                 Branch

 

             BMI          2021-10-21 14:50:00 38.22 kg/m2               Universi

ty of



                                                                 [facility]



                                                                 Branch

 

             Systolic blood 2020 02:47:00 136 mm[Hg]                Univer

sity of



             pressure                                            [facility]



                                                                 Branch

 

             Diastolic blood 2020 02:47:00 88 mm[Hg]                 Unive

rsity of



             pressure                                            [facility]



                                                                 Branch

 

             Heart rate   2020 02:47:00 129 /min                  Universi

ty of



                                                                 [facility]

 

             Body temperature 2020 02:47:00 37.11 Maria Teresa                 Univ

ersity of



                                                                 [facility]



                                                                 Branch

 

             Respiratory rate 2020 02:47:00 18 /min                   Univ

ersity of



                                                                 [facility]



                                                                 Branch

 

             Body height  2020 02:47:00 170.2 cm                  Universi

ty of



                                                                 [place] [facility]



                                                                 Branch

 

             Body weight  2020 02:47:00 107.14 kg                 Universi

ty of



                                                                 [place] [facility]



                                                                 Branch

 

             BMI          2020 02:47:00 36.99 kg/m2               Universi

ty of



                                                                 [facility]

 

             Oxygen saturation in 2020 02:47:00 97 /min                   

[facility]



             Arterial blood by                                        [place] [facility]

renita



             Pulse oximetry                                        Branch

 

             Systolic blood 2019 19:22:00 124 mm[Hg]                Univer

sity of



             pressure                                            [place] [facility]



                                                                 Branch

 

             Diastolic blood 2019 19:22:00 73 mm[Hg]                 Unive

rsity of



             pressure                                            [facility]



                                                                 [place]

 

             Heart rate   2019 19:22:00 103 /min                  Universi

ty of



                                                                 [place] [facility]



                                                                 [place]

 

             Body temperature 2019 19:22:00 37.17 Maria Teresa                 Univ

ersity of



                                                                 [facility]



                                                                 [place]

 

             Respiratory rate 2019 19:22:00 18 /min                   Univ

ersity of



                                                                 [facility]



                                                                 [place]

 

             Body height  2019 19:22:00 167.6 cm                  Universi

ty of



                                                                 [facility]



                                                                 Branch

 

             Body weight  2019 19:22:00 103.692 kg                Universi

ty of



                                                                 [facility]



                                                                 Branch

 

             BMI          2019 19:22:00 36.90 kg/m2               Universi

ty of



                                                                 [facility]



                                                                 Branch

 

             Oxygen saturation in 2019 19:22:00 98 /min                   

[facility]



             Arterial blood by                                        [place] [facility]

renita



             Pulse oximetry                                        Branch







Procedures







                Procedure       Date / Time     Performing Clinician Source



                                Performed                       

 

                TROPONIN I      2021-10-21 17:51:00 Michael Elizalde Universi

ty of [facility]

 

                XR CHEST 1 VW   2021-10-21 16:43:13 Michael Elizalde [facility]

 

                POCT PREGNANCY TEST 2021-10-21 15:53:00 Michael Elizalde [facility]

 

                LIPASE          2021-10-21 15:47:00 Michael Elizalde [facility]

 

                TROPONIN I      2021-10-21 15:47:00 Michael Elizalde [facility]

 

                FREE T4         2021-10-21 15:47:00 Michael Elizalde [facility]

 

                THYROID STIMULATING 2021-10-21 15:47:00 Michael Elizalde [facility]

[facility] of [place]



                HORMONE                                         [facility]

 

                COMP. METABOLIC PANEL 2021-10-21 15:47:00 Michael Elizalde [facility]



                ([id number])                                         [facility]

 

                CBC WITH DIFF   2021-10-21 15:47:00 Michael Elizalde [facility]

 

                D-DIMER         2021-10-21 15:47:00 Michael Elizalde [facility]

 

                URINALYSIS      2021-10-21 15:47:00 Michael Elizalde [facility]

 

                N-TERMINAL PRO-BNP 2021-10-21 15:47:00 Michael Elizalde [facility]

 

                HB ECG ROUTINE & RHYTHM 2021-10-21 15:01:04 Michael Elizalde 

[facility]

 

                CONSENT/REFUSAL FOR 2021-10-21 14:50:26 Doctor Unassigned, [facility]



                DIAGNOSIS AND TREATMENT                 [place]         [facility]

 

                MR ANKLE LEFT WO CONTRAST 2020 16:24:12 Requisition, Paper

 [facility]

 

                XR ANKLE 3+ VW LEFT 2020 15:25:26 Requisition, Paper [facility]

 

                CONSENT/REFUSAL FOR 2020 15:14:42 Doctor Tammiessbronwyn, [facility]



                DIAGNOSIS AND TREATMENT                 [place]         Medical 

[place]

 

                ASSIGNMENT OF BENEFITS 2020 15:14:30 Doctor Unassigned, [facility]



                                                [place]         [facility]

 

                INSURANCE CORRESPONDENCE 2020 06:01:00 Doctor Kelsy, 

[facility]



                                                [place]         Medical Branch

 

                POCT FLU A AND B 2020 02:49:00 Michael Wilkes [facility]



                (MOLECULAR)                                     Medical Branch

 

                POCT GRP A STREP 2020 02:42:00 Michael Wilkes [facility]



                (MOLECULAR)                                     Medical Branch

 

                BI ULTRASOUND BREAST 2020 22:09:36 Jeanine Us [facility]



                LIMITED BILATERAL                 A               Medical Branch

 

                BI DIAGNOSTIC MAMMOGRAM 2020 21:45:14 Jeanine Us

 [facility]



                BILATERAL                       A               Medical Branch

 

                BI DIAGNOSTIC MAMMOGRAM 2020 21:45:14 Magen Jeanine

 [facility]



                BILATERAL                       A               Medical Branch

 

                CONSENT/REFUSAL FOR 2020 14:18:29 Doctor Kelsy, [facility]



                DIAGNOSIS AND TREATMENT                 [place]         Medical 

Branch

 

                ASSIGNMENT OF BENEFITS 2020 14:18:15 Doctor Kelsy, [facility]



                                                [place]         Medical Branch







Encounters







        Start   End     Encounter Admission Attending Care    Care    Encounter 

Source



        Date/Time Date/Time Type    Type    Clinicians Facility Department ID   

   

 

        2022 Outpatient         GC_[facility]_ PRIV    PRIV    163

[id number] [facility]



        02:42:00 02:42:00                 Black_FERNANDA                 [id number] [facility]

l

 

        2022 Outpatient         Black Daisy PRIV    PRIV    51a

[id number] 



        00:00:00 00:00:00                 Lui                 [id number] 



                                                                [id number] 



                                                                [id number]  

 

        2022 Outpatient R       RADIOLOGY [facility]    [id number]

8N-20 Univers



        10:20:00 10:20:00                                         [id number]  ity of



                                                                        [facility]

 

        2022 Outpatient R       RADIOLOGY [facility]    [id number]

[id number] Univers



        11:17:25 23:59:00                                                 ity of



                                                                        [facility]

 

        2022 Hospital         Radiology [facility]    1.2.840.114 [id number]

[id number] Univers



        11:17:25 23:59:00 Encounter                 ANGLEMAYTE 350.1.13.10        

 ity 



                                                [facility] 4.2.7.2.686         [place]

[facility]  [phone]         Medi

renita



                                                        800             Branch

 

        2022 Outpatient R               [facility]    [id number]

-20 Univers



        11:40:00 11:40:00                                         [id number]  ity [facility]

 

        2022 Outpatient R       RADIOLOGY [facility]    [id number]

8N-20 Univers



        00:00:00 00:00:00                                         [id number]  ity [facility]

 

        2022 Outpatient R       RADIOLOGY [facility]    [id number]

[id number] Univers



        00:00:00 00:00:00                                                 ity [facility]

 

        2022 Outpatient         GC_[facility]_ PRIV    PRIV    163

[id number] [facility]



        12:52:00 12:52:00                 Black_D                 [id number] [facility]

l

 

        2022 Outpatient         GC_[facility]_ PRIV    PRIV    163

[id number] [facility]



        03:02:00 03:02:00                 Black_D                 [id number] [facility]

l

 

        2021 Outpatient R               [facility]    [id number]

-20 Univers



        20:40:00 20:40:00                                         [id number]  [facility]

 

        2021 [facility]       [facility]    [id number]

1532 Univers



        15:00:00 15:44:33                 [facility]

f



                                                                        [facility]

 

        2021 Urgent          [facility]    1.2.840.114 [id number]

157 Univers



        15:00:00 15:44:33 Care            [facility]  350.1.13.10         i

ty of



                                                [place] 4.2.7.2.686         [place]

as



                                                LIU?BLEA [phone]         Me

[address]



                                                MEDICAL                 



                                                OFFICE                  



                                                BUILDING                 

 

        2021 Outpatient R               [facility]    [id number]

-20 Univers



        15:00:00 15:00:00                                         [id number]  [facility]

 

        2021 [facility] R       [facility]    [id number]

135 Univers



        10:00:00 10:00:00                 CARL                          ity [facility]

 

        2021 Telephone         GIDEON Lopez    [id number]

6526 Univers



        00:00:00 00:00:00                 Kimberly NICOLE   350.1.13.10         it

y of



                                                [facility] 4.2.7.2.686         [place]

as



                                                        [phone]         [address]

 

        2021 Outpatient R       MILTON   [facility]    [id number]

042 Univers



        17:00:00 17:35:39                 NICK                          ity [facility]

 

        2021 Laboratory         Only, Ang Db Test [facility]    1.2.8

40.114 [id number] 

Univers



        17:00:00 17:15:00 Only            [facility]  350.1.13.10      

   ity of



                                                [place] 4.2.7.2.686         [place]

[place]



                                                LIU?BLEA [phone]         Me

nabilal



                                                GARETH    [address]             [place]



                                                MEDICAL                 



                                                OFFICE                  



                                                [facility]                 

 

        2021 Outpatient R               [facility]    [id number]

-20 Univers



        17:00:00 17:00:00                                         [id number]  ity [facility]

 

        2021 Outpatient R               [facility]    [id number]

-20 Univers



        15:00:00 15:00:00                                         [id number]  ity [facility]

 

        2021 Outpatient R               [facility]    [id number]

097 Univers



        15:00:00 15:00:00                                                 ity [facility]

 

        2021-10-22 2021-10-22 [facility]    [id number]

5162 Univers



        18:21:26 23:59:00 Encounter         [facility]  350.1.13.10         

ity of



                                                [place] 4.2.7.2.686         [place]

[place]



                                                Liu?Blea [phone]         Me

nabilal



                                                abel    [address]             [place]



                                                Medical                 



                                                Office                  



                                                [facility]                 

 

        2021-10-22 2021-10-22 Urgent          [facility]    1.2.840.114 [id number]

441 Univers



        18:06:16 18:45:02 Care            [facility]  350.1.13.10         it

y of



                                                [place] 4.2.7.2.686         [place]

as



                                                Liu?Blea [phone]         Me

moise



                                                [address]



                                                Medical                 



                                                Office                  



                                                Building                 

 

        2021-10-22 2021-10-22 Outpatient                 [facility]    [id number]

-20 Univers



        18:20:00 18:20:00                                         [id number]  ity [facility]

 

        2021-10-22 2021-10-22 Outpatient R       KM, [facility]    [id number]

[id number] Univers



        18:20:00 18:20:00                 RANIA                           [facility]

 

        2021-10-21 2021-10-21 Emergency         Ibikunle, TRAUMA  1.2.840.114 [id number]

[id number] Univers



        09:51:00 14:26:00                 [facility]  350.1.13.10         

ity of



                                                        4.2.7.2.686         [place]

[place]



                                                        [phone]         [address]

 

        2021-10-21 2021-10-21 Emergency X       IBIKUNLE, [facility]    ERT     [id number]

[id number] Univers



        09:51:00 14:26:00                 [facility]                         [facility]

 

        2021-10-14 2021-10-14 Outpatient                 [facility]    [id number]

-20 Univers



        18:00:00 18:00:00                                         [id number]  [facility]

 

        2021-10-14 2021-10-14 Outpatient R       UNKNOWN, [facility]    [id number]

[id number] Univers



        18:00:00 18:00:00                 ATTENDING                         [facility]

 

        2021 Outpatient R       RADIOLOGY [facility]    [id number]

8N-20 Univers



        00:00:00 00:00:00                                         [id number]  [facility]

 

        2021 Outpatient R       MAGEN, [facility]    6457

38N-20 Univers



        10:00:00 10:00:00                 JEANINE                 [id number]  [facility]

 

        2021-07-15 2021-07-15 Outpatient R       RADIOLOGY [facility]    [id number]

8N-20 Univers



        10:20:00 10:20:00                                         [id number]  [facility]

 

        2021 Patient         Danyelle  [facility]    1.2.840.114 [id number]

36 Univers



        00:00:00 00:00:00 Outreach         Carl  PRIMARY 350.1.13.10         i

ty of



                                        [facility]    4.2.7.2.686         [place]

[place]



                                                \[facility]\""ILLION [phone]         Me

dical



                                                        [address]             [place]

 

        2020 Hospital         Radiology [facility]    1.2.840.114 [id number]

[id number] Univers



        09:14:41 23:59:00 Encounter                 [place] 350.1.13.10        

 ity of



                                                [place] 4.2.7.2.686         [place]

[place]



                                                [place]  [phone]         Medi

renita



                                                        [address]             Branch

 

        2020 Outpatient                 [facility]    [id number]

-20 Univers



        10:30:00 10:30:00                                           ity of



                                                                        [facility]

 

        2020 Hospital         Radiology [facility]    1.2.840.114 [id number]

[id number] Univers



        09:14:00 09:14:00 Encounter                 Enriqueta 350.1.13.10        

 ity of



                                                [place] 4.2.7.2.686         [place]

[place]



                                                [place]  [phone]         Medi

renita



                                                        [address]             [place]

 

        2020 Outpatient R       RADIOLOGY [facility]    [id number]

[id number] Univers



        00:00:00 00:00:00                                                 ity of



                                                                        [facility]

 

        2020 Orders          Doctor MENESES    1.2.840.114 [id number]

53 Univers



        00:00:00 00:00:00 Only            Unassigned, COREY   350.1.13.10       

  ity of



                                        [place] [facility] 4.2.7.2.686         [place]

as



                                                        [phone]         Medi

renita



                                                        [address]             [place]

 

        2020-10-19 2020-10-19 Outpatient R               [facility]    [id number]

-20 Univers



        17:20:00 17:20:00                                         [id number]  ity of



                                                                        [facility]

 

        2020-10-19 2020-10-19 Outpatient R       [facility]    [id number]

838 Univers



        17:20:00 17:20:00                 VAMSI                         ity of



                                                                        [facility]

 

        2020-10-19 2020-10-19 Telephone         [facility]    1.2.840.114 [id number]

[id number] Univers



        00:00:00 00:00:00                 Jeanine Robison 350.1.13.10      

   ity of



                                                [place] 4.2.7.2.686         [place]

[place]



                                                [place] [phone]         Me

dical



                                                nal     [address]             [facility]                 

 

        2020 Telemedici         [facility]    1.2.840.114 

[id number] Univers



        08:24:11 15:33:52 ne Visit         Jeanine GAITAN [place] 350.1.13.10     

    ity of



                                                [place] 4.2.7.2.686         [place]

[place]



                                                [place] [phone]         Me

dical



                                                nal     231             [facility]                 

 

        2020 Outpatient R       MAGEN [facility]    1027

[id number] Univers



        14:20:00 14:20:00                 JEANINE                         [facility]

 

        2020 Outpatient R       US [facility]    6457

38N-20 Univers



        11:00:00 11:00:00                 JEANINE                 2007  ity 

[facility]

 

        2020 Outpatient R       MAGEN [facility]    1027

[id number] Univers



        11:00:00 11:00:00                 JEANINE                         [facility]

 

        2020 Outpatient R       US [facility]    6457

38N-20 Univers



        13:40:00 13:40:00                 JEANINE                   [facility]

 

        2020 Outpatient R       US [facility]    6457

38N-20 Univers



        10:40:00 10:40:00                 JEANINE                 2006  [facility]

 

        2020 Outpatient R       US [facility]    1027

[id number] Univers



        10:40:00 10:40:00                 JEANINE                         [facility]

 

        2020 Urgent          Vanessa Armando [facility]    1.2.840.114 [id number]

[id number] Univers



        20:43:21 21:52:43 Care            Unknown, Attending Health  350.1.13.10

         ity of



                                                S[facility] 4.2.7.2.686         [place]

as



                                                Specialti [phone]         Me

dical



                                                es      [address]             [facility]                 

 

        2020 [facility]         [facility]    1.2.840.114 [id number]

[id number] Univers



        00:00:00 00:00:00 Secure Msg         Jeanine A [place] 350.1.13.10   

      ity of



                                                [place] 4.2.7.2.686         [place]

[place]



                                                [place] [phone]         Me

dical



                                                [address]                 

 

        2020 [facility]patient R       [facility]    1025

[id number] Univers



        14:54:24 23:59:00                 JEANINE                         ity 

of



                                                                        [facility]

 

        2020 [facility]         [facility]    1.2.840.114 [id number]

[id number] Univers



        14:54:00 23:59:00 Encounter         Jeanine A [place] 350.1.13.10    

     ity of



                                                [place] 4.2.7.2.686         [place]

[place]



                                                [place]  [phone]         Medi

renita



                                                        [address]             [place]

 

        2020 [facility]    1.2.840.114 [id number]

[id number] Univers



        14:53:00 14:53:00 Encounter         Jeanine A [place] 350.1.13.10    

     ity of



                                                [place] 4.2.7.2.686         [place]

[place]



                                                [place]  [phone]         Medi

renita



                                                        [address]             [place]

 

        2020 [facility]    1.2.840.114 [id number]

[id number] Univers



        08:26:00 14:52:00 Encounter         Jeanine A [place] 350.1.13.10    

     ity of



                                                [place] 4.2.7.2.686         [place]

[place]



                                                [place]  [phone]         Medi

renita



                                                        [address]             [place]

 

        2020 [facility]    1.2.840.114 73

[id number] Univers



        08:00:00 08:25:00 Encounter         Jeanine A [place] 350.1.13.10    

     ity of



                                                [place] 4.2.7.2.686         [place]

[place]



                                                [place]  [phone]         Medi

renita



                                                        [address]             Branch

 

        2020 [facility]    1.2.840.114 [id number]

[id number] Univers



        00:00:00 00:00:00                 Jeanine Robison 350.1.13.10      

   ity of



                                                [place] 4.2.7.2.686         [place]

[place]



                                                [place] [phone]         Me

dical



                                                nal     [address]             Branch



                                                Building                 

 

        2019 Urgent          Michael Wilkes [facility]    1.2.840.11

[id number] Univers



        14:19:51 14:56:56 Care            Unknown, Attending Health  350.1.13.10

         ity of



                                                Surgical 4.2.7.2.686         [place]

as



                                                Specialti [phone]         Me

dical



                                                es      [address]             Branch



                                                Enriqueta                 







Results







           Test Description Test Time  Test Comments Results    Result Comments 

Source









                    TROPONIN I          2021-10-21 18:32:29 









                      Test Item  Value      Reference Range Interpretation Comme

nts









             TROPONIN I (test code = 0.002 ng/mL  See_Comment                [Au

tomated message] The



             [id number])                                         system which ge

nerated



                                                                 this result tra

nsmitted



                                                                 reference range

: <=0.034.



                                                                 The reference r

therese was



                                                                 not used to int

erpret



                                                                 this result as



                                                                 normal/abnormal

.

 

             POLI (test code = POLI) Reference (Normal)                           



                          Range (defined by the                           



                          99th percentile                           



                          reference limit): <=                           



                          0.034 ng/mL Note:                           



                          Cardiac troponin begins                           



                          to rise 3-4 hours after                           



                          the onset of ischemia.                           



                          Repeat in 4-6 hours if                           



                          the sample was drawn                           



                          within 3-4 hours of the                           



                          onset of the symptom                           



                          and found normal.                           



                          Diagnosis of myocardial                           



                          injury is made with                           



                          acute changes in cTn                           



                          concentrations with at                           



                          least one serial sample                           



                          above the 99th                           



                          percentile upper                           



                          reference limit (URL),                           



                          taken together with the                           



                          patient's clinical                           



                          presentation.  Biotin                           



                          has been reported to                           



                          cause a negative bias,                           



                          interpret results                           



                          relative to patient's                           



                          use of biotin.                           

 

             Lab Interpretation Normal                                 



             (test code = 67852-1)                                        



[facility]2021-10-21 16:44:53





             Test Item    Value        Reference Range Interpretation Comments

 

             TSH (test code =              See_Comment                [Automated

 message]



             [id number])                                         The system [employer]



                                                                 generated this 

result



                                                                 transmitted ref

erence



                                                                 range: 0.45 - 4

.70



                                                                 mIU/L. The refe

rence



                                                                 range was not u

sed to



                                                                 interpret this 

result



                                                                 as normal/abnor

mal.

 

             Lab Interpretation (test Normal                                 



             code = 10848-6)                                        



[id number]





             Test Item    Value        Reference Range Interpretation Comments

 

             FREE T4 (test code =              See_Comment                [Autom

ated message]



             7581183021)                                         The system [employer]



                                                                 generated this 

result



                                                                 transmitted ref

erence



                                                                 range: 0.78 - 2

.20



                                                                 ng/dL:. The ref

erence



                                                                 range was not u

sed to



                                                                 interpret this 

result



                                                                 as normal/abnor

mal.

 

             Lab Interpretation (test Normal                                 



             code = 86936-8)                                        



[id number] 16[facility]26:33





             Test Item    Value        Reference Range Interpretation Comments

 

             NT-proBNP (test code 23 pg/mL     See_Comment                [Autom

ated



             = 7951480121)                                        message] The



                                                                 system which



                                                                 generated this



                                                                 result



                                                                 transmitted



                                                                 reference range

:



                                                                 <=125. The



                                                                 reference range



                                                                 was not used to



                                                                 interpret this



                                                                 result as



                                                                 normal/abnormal

.

 

             POLI (test code = POLI) Biotin has been                           



                          reported to                            



                          cause a negative                           



                          bias, interpret                           



                          results relative                           



                          to patient's use                           



                          of biotin.                             

 

             Lab Interpretation Normal                                 



             (test code = 78935-2)                                        



[facility]





             Test Item    Value        Reference    Interpretation Comments



                                       Range                     

 

             TROPONIN I (test 0.002 ng/mL  See_Comment                [Automated



             code = 9474947801)                                        message] 

The



                                                                 system which



                                                                 generated this



                                                                 result



                                                                 transmitted



                                                                 reference range

:



                                                                 <=0.034. The



                                                                 reference range



                                                                 was not used to



                                                                 interpret this



                                                                 result as



                                                                 normal/abnormal

.

 

             POLI (test code = Reference (Normal)                           



             POLI)         Range (defined by                           



                          the 99th percentile                           



                          reference limit): <=                           



                          0.034 ng/mL Note:                           



                          Cardiac troponin                           



                          begins to rise 3-4                           



                          hours after the                           



                          onset of ischemia.                           



                          Repeat in 4-6 hours                           



                          if the sample was                           



                          drawn within 3-4                           



                          hours of the onset                           



                          of the symptom and                           



                          found normal.                           



                          Diagnosis of                           



                          myocardial injury is                           



                          made with acute                           



                          changes in cTn                           



                          concentrations with                           



                          at least one serial                           



                          sample above the                           



                          99th percentile                           



                          upper reference                           



                          limit (URL), taken                           



                          together with the                           



                          patient's clinical                           



                          presentation.                           



                          Biotin has been                           



                          reported to cause a                           



                          negative bias,                           



                          interpret results                           



                          relative to                            



                          patient's use of                           



                          biotin.                                

 

             Lab Interpretation Normal                                 



             (test code =                                        



             24713-2)                                            



[facility]





             Test Item    Value        Reference Range Interpretation Comments

 

             LIPASE (test code = 3455708300) 69 U/L       0-220                 

    

 

             Lab Interpretation (test code = Normal                             

    



             40695-1)                                            



[facility]. METABOLIC PANEL ([id number])2021-10-21 
16:11:10





             Test Item    Value        Reference Range Interpretation Comments

 

             NA (test code = 137 mmol/L   135-145                   



             8951443848)                                         

 

             K (test code = 4.3 mmol/L   3.5-5.0                   



             4013215597)                                         

 

             CL (test code = 105 mmol/L                       



             3441221294)                                         

 

             CO2 TOTAL (test code = 22 mmol/L    23-31        L            



             0521463657)                                         

 

             AGAP (test code =              2-16                      



             7272150657)                                         

 

             BUN (test code = 12 mg/dL     7-23                      



             2672553848)                                         

 

             GLUCOSE (test code = 92 mg/dL                         



             8628034370)                                         

 

             CREATININE (test code = 0.79 mg/dL   0.50-1.04                 



             3782544745)                                         

 

             TOTAL BILI (test code = 0.3 mg/dL    0.1-1.1                   



             5072806548)                                         

 

             CALCIUM (test code = 8.7 mg/dL    8.6-10.6                  



             7540085153)                                         

 

             T PROTEIN (test code = 7.0 g/dL     6.3-8.2                   



             6551280687)                                         

 

             ALBUMIN (test code = 4.1 g/dL     3.5-5.0                   



             4057659706)                                         

 

             ALK PHOS (test code = 53 U/L                           



             8967950502)                                         

 

             ALTv (test code = 26 U/L       5-35                      



             1742-6)                                             

 

             AST(SGOT) (test code = 30 U/L       13-40                     



             1386293220)                                         

 

             eGFR (test code =              mL/min/1.73m2              



             4979541299)                                         

 

             POLI (test code = POLI) Association of                           



                          Glomerular Filtration                           



                          Rate (GFR) and Staging                           



                          of Kidney Disease*                           



                          +---------------------                           



                          --+-------------------                           



                          --+-------------------                           



                          ------+| GFR                           



                          (mL/min/1.73 m2) ?|                           



                          With Kidney Damage ?|                           



                          ?Without Kidney                           



                          Damage+---------------                           



                          --------+-------------                           



                          --------+-------------                           



                          ------------+| ?>90 ?                           



                          ? ? ? ? ? ? ? ?|                           



                          ?Stage one ? ? ? ? ?|                           



                          ? Normal ? ? ? ? ? ? ?                           



                          ?+--------------------                           



                          ---+------------------                           



                          ---+------------------                           



                          -------+| ?60-89 ? ? ?                           



                          ? ? ? ? ?| ?Stage two                           



                          ? ? ? ? ?| ? Decreased                           



                          GFR ? ? ? ?                            



                          +---------------------                           



                          --+-------------------                           



                          --+-------------------                           



                          ------+| ?30-59 ? ? ?                           



                          ? ? ? ? ?| ?Stage                           



                          three ? ? ? ?| ? Stage                           



                          three ? ? ? ? ?                           



                          +---------------------                           



                          --+-------------------                           



                          --+-------------------                           



                          ------+| ?15-29 ? ? ?                           



                          ? ? ? ? ?| ?Stage four                           



                          ? ? ? ? | ? Stage four                           



                          ? ? ? ? ?                              



                          ?+--------------------                           



                          ---+------------------                           



                          ---+------------------                           



                          -------+| ?<15 (or                           



                          dialysis) ? ?| ?Stage                           



                          five ? ? ? ? | ? Stage                           



                          five ? ? ? ? ?                           



                          ?+--------------------                           



                          ---+------------------                           



                          ---+------------------                           



                          -------+ *Each stage                           



                          assumes the associated                           



                          GFR level has been in                           



                          effect for at least                           



                          three months. ?Stages                           



                          1 to 5, with or                           



                          without kidney                           



                          disease, indicate                           



                          chronic kidney                           



                          disease. Notes:                           



                          Determination of                           



                          stages one and two                           



                          (with eGFR                             



                          >59mL/min/1.73 m2)                           



                          requires estimation of                           



                          kidney damage for at                           



                          least three months as                           



                          defined by structural                           



                          or functional                           



                          abnormalities of the                           



                          kidney, manifested by                           



                          either:Pathological                           



                          abnormalities or                           



                          Markers of kidney                           



                          damage (including                           



                          abnormalities in the                           



                          composition of the                           



                          blood or urine or                           



                          abnormalities in                           



                          imaging tests).                           

 

             Lab Interpretation Abnormal                               



             (test code = 99134-8)                                        



[id number]51





             Test Item    Value        Reference    Interpretation Comments



                                       Range                     

 

             D-DIMER (test code =              See_Comment                [Autom

ated



             4516029984)                                         message] The



                                                                 system which



                                                                 generated this



                                                                 result



                                                                 transmitted



                                                                 reference range

:



                                                                 <0.50 ?g/mL



                                                                 (FEU). The



                                                                 reference range



                                                                 was not used to



                                                                 interpret this



                                                                 result as



                                                                 normal/abnormal

.

 

             POLI (test code = This test may be                           



             POLI)         used in conjunction                           



                          with a clinical                           



                          pretest probability                           



                          (PTP) assessment                           



                          model to exclude                           



                          venous                                 



                          thromboembolism                           



                          (VTE) in patients                           



                          suspected of deep                           



                          venous thrombosis                           



                          (DVT) and pulmonary                           



                          embolism (PE)  A                           



                          D-Dimer value less                           



                          than 0.50 ?g/ml                           



                          (FEU) has a negative                           



                          predicative value of                           



                          96 to 100% (95%                           



                          CI)and 97 to 100%                           



                          (95% CI) as an aid                           



                          in the diagnosis of                           



                          deep vein thrombosis                           



                          (DVT) and pulmonary                           



                          embolism when there                           



                          is low or moderate                           



                          pretest probability                           



                          of PE or DVT.                           



                          D-Dimer values are                           



                          expressed in initial                           



                          fibrinogen                             



                          equivalent units                           



                          (FEU)" The assay                           



                          results should be                           



                          used with other                           



                          information,                           



                          including the                           



                          clinical context, in                           



                          forming a diagnosis.                           



                                                                 

 

             Lab Interpretation Normal                                 



             (test code =                                        



             62209-8)                                            



[facility] WITH DIFF2021-10-21 15:58:50





             Test Item    Value        Reference Range Interpretation Comments

 

             WBC (test code =              See_Comment                [Automated



             6690-2)                                             message] The sy

stem



                                                                 which generated



                                                                 this result



                                                                 transmitted



                                                                 reference range

:



                                                                 4.30 - 11.10



                                                                 10*3/?L. The



                                                                 reference range

 was



                                                                 not used to



                                                                 interpret this



                                                                 result as



                                                                 normal/abnormal

.

 

             RBC (test code =              See_Comment  H             [Automated



             789-8)                                              message] The sy

stem



                                                                 which generated



                                                                 this result



                                                                 transmitted



                                                                 reference range

:



                                                                 3.93 - 5.25



                                                                 10*6/?L. The



                                                                 reference range

 was



                                                                 not used to



                                                                 interpret this



                                                                 result as



                                                                 normal/abnormal

.

 

             HGB (test code = 13.4 g/dL    11.6-15.0                 



             718-7)                                              

 

             HCT (test code = 41.5 %       35.7-45.2                 



             4544-3)                                             

 

             MCV (test code = 73.8 fL      80.6-95.5    L            



             787-2)                                              

 

             MCH (test code = 23.8 pg      25.9-32.8    L            



             785-6)                                              

 

             MCHC (test code = 32.3 g/dL    31.6-35.1                 



             786-4)                                              

 

             RDW-SD (test code = 38.5 fL      39.0-49.9    L            



             82587-9)                                            

 

             RDW-CV (test code = 14.5 %       12.0-15.5                 



             788-0)                                              

 

             PLT (test code =              See_Comment                [Automated



             777-3)                                              message] The sy

stem



                                                                 which generated



                                                                 this result



                                                                 transmitted



                                                                 reference range

:



                                                                 166 - 358 10*3/

?L.



                                                                 The reference r

therese



                                                                 was not used to



                                                                 interpret this



                                                                 result as



                                                                 normal/abnormal

.

 

             MPV (test code = 9.6 fL       9.5-12.9                  



             37912-6)                                            

 

             NRBC/100 WBC (test              See_Comment                [Automat

ed



             code = 0810963750)                                        message] 

The system



                                                                 which generated



                                                                 this result



                                                                 transmitted



                                                                 reference range

:



                                                                 0.0 - 10.0 /100



                                                                 WBCs. The refer

ence



                                                                 range was not u

sed



                                                                 to interpret th

is



                                                                 result as



                                                                 normal/abnormal

.

 

             NRBC x10^3 (test code <0.01        See_Comment                [Auto

mated



             = [id number])                                        message] The s

ystem



                                                                 which generated



                                                                 this result



                                                                 transmitted



                                                                 reference range

:



                                                                 10*3/?L. The



                                                                 reference range

 was



                                                                 not used to



                                                                 interpret this



                                                                 result as



                                                                 normal/abnormal

.

 

             GRAN MAT (NEUT) % 78.2 %                                 



             (test code = 770-8)                                        

 

             IMM GRAN % (test code 0.30 %                                 



             = [id number])                                        

 

             LYMPH % (test code = 15.1 %                                 



             736-9)                                              

 

             MONO % (test code = 4.8 %                                  



             5905-5)                                             

 

             EOS % (test code = 1.3 %                                  



             713-8)                                              

 

             BASO % (test code = 0.3 %                                  



             706-2)                                              

 

             GRAN MAT x10^3(ANC) 7.84 10*3/uL 1.88-7.09    H            



             (test code =                                        



             2849526237)                                         

 

             IMM GRAN x10^3 (test 0.03 10*3/uL 0.00-0.06                 



             code = [id number])                                        

 

             LYMPH x10^3 (test code 1.51 10*3/uL 1.32-3.29                 



             = 731-0)                                            

 

             MONO x10^3 (test code 0.48 10*3/uL 0.33-0.92                 



             = 742-7)                                            

 

             EOS x10^3 (test code = 0.13 10*3/uL 0.03-0.39                 



             711-2)                                              

 

             BASO x10^3 (test code 0.03 10*3/uL 0.01-0.07                 



             = 704-7)                                            

 

             Lab Interpretation Abnormal                               



             (test code = 17569-2)                                        



[facility]





             Test Item    Value        Reference Range Interpretation Comments

 

             On board controls acceptable with negative                         

      



             C Line (test code = 3574)                                        

 

             POCT PREG LOT # (test code = 3575) [id number]                      

       

 

             POCT PREG TEST  DATE (test 2023                        

     



             code = 3576)                                        

 

             Lab Interpretation (test code = Normal                             

    



             59811-9)                                            



[facility] [id number] 20:37:44 
Changes of Achilles tendon repair with partial thickness interstitial tearof the
Achilles tendon. Mild flexor tenosynovitis with a split tear of the flexor 
digitorum tendonand partial tear of the flexor hallucis longus tendon. Peroneus 
longus tendinopathy. Chronic multi ligamentous injury.    EXAM: MRI LEFT ANKLE 
HISTORY: Spontaneous rupture of tendons, fall  COMPARISON: Radiographs
2020 TECHNIQUE AND FINDINGS: 3T OR 1.5T multiplanar multiweighted MR 
imaging of the left ankle wasperformed without IV contrast. BONE AND JOINT:  
Changes of prior Achilles tendon repair are seen with fixation screws inthe 
posterior calcaneus. Mild marrow edema is seen in the posteriorcalcaneus. No 
jointeffusion is identified. LIGAMENTS AND TENDONS:The distal Achilles tendon is
diffusely thickened and intermediate insignal intensity. Interstitial fluid 
signal intensity is seen in theAchilles tendon cranial to the its insertion and 
at the level of itsinsertion on the calcaneus. The extensor tendons are 
unremarkable. The peroneus longus tendon is thickened and intermediate in 
signalintensity. The peroneus brevis tendon is intact. Fluid is seen within the 
posterior tibialis, flexor digitorum and flexorhallucis longus tendon sheaths. 
The flexor digitorum tendon appears splitjust distal to the medial malleolus. 
The flexor hallucis longus tendon isattenuated as it courses posterior to the 
tibia and a Stieda process. The anterior talofibular ligament is severely 
attenuated. Thecalcaneofibular ligament is thickened and intermediate in signal 
intensity.The deltoid, syndesmotic and posterior talofibular ligaments are 
intact. SOFT TISSUES:Mild edema is seen in the pre-Achilles fat with trace fluid
accumulating inthe retrocalcaneal bursa. [facility], Radiant Results Inft User - 
2020  2:38 PM CSTEXAM:MRI LEFT ANKLEHISTORY: Spontaneous rupture of 
tendons, fall COMPARISON: Radiographs 2020TECHNIQUE AND 
FINDINGS:3T OR 1.5T multiplanar multiweighted MR imaging of the left ankle 
wasperformed without IV contrast.BONE AND JOINT: Changes of prior Achilles 
tendon repair are seen with fixation screws inthe posterior calcaneus. Mild 
marrow edema is seen in the posteriorcalcaneus.No joint effusion is 
identified.LIGAMENTS AND TENDONS:The distal Achilles tendon is diffusely 
thickened and intermediate insignal intensity. Interstitial fluid signal 
intensity is seen in theAchilles tendon cranial to the its insertion and at the 
level of itsinsertion on the calcaneus.The extensor tendons are unremarkable.The
peroneus longus tendon is thickened and intermediate in signalintensity. The 
peroneus brevis tendon is intact.Fluid is seen within the posterior tibialis, 
flexor digitorum and flexorhallucis longus tendon sheaths. The flexor digitorum 
tendon appears splitjust distal to the medial malleolus. The flexor hallucis 
longus tendon isattenuated as it courses posterior to the tibia and a Stieda 
process.The anterior talofibular ligament is severely attenuated. 
Thecalcaneofibular ligament is thickened and intermediate in signal 
intensity.The deltoid, syndesmotic and posterior talofibular ligaments are in
tact.SOFT TISSUES:Mild edema is seen in the pre-Achilles fat with trace fluid 
accumulating inthe retrocalcaneal bursa.IMPRESSIONChanges of Achilles tendon 
repair with partial thickness interstitial tearof the Achilles tendon.Mild 
flexor tenosynovitis with a split tear of the flexor digitorum tendonandpartial 
tear of the flexor hallucis longus tendon.Peroneus longus tendinopathy.Chronic 
multi ligamentous injury.[facility]
2020 16:45:14 Changes of prior Achilles tendon repair with distal Achilles
tendonthickening and swelling. Soft tissue swelling over the posterior ankle, 
heel and lateral midfoot. Preliminary Report Dictated by Resident: Amy Bezold I,
Deborah ?Stedman, MD., have reviewed this study and agree with the 
abovereport.EXAM: XR ANKLE 3+ VW LEFT HISTORY: 40 years-old Female with re-
injury and spontaneous rupture ofother tendons. COMPARISON: None. FINDINGS:  
Radiographs of the left ankle demonstrate a subcentimeter osseous fragmentis 
seen along the calcaneus at the expected achilles insertion site.Plantar 
enthesophyte noted. ?Joint spaces are preserved. Alignment iswithin normal 
limits. Soft tissue swelling over the posterior ankle, heeland lateral midfoot. 
The distal Achilles tendon is thickened and indistinctwith edema in the pre-
Achilles fat. Lucent tracks from Achilles tendonrepair are seen in the posterior
calcaneus. [facility], Radiant Results Inft User - 2020 10:46 AM CSTEXAM: XR 
ANKLE 3+ VW LEFTHISTORY: 40 years-old Female with re-injury and spontaneous 
rupture ofother tendons.COMPARISON: None.FINDINGS: Radiographs of the left ankle
demonstrate a subcentimeter osseous fragmentis seen along the calcaneus at the 
expected achilles insertion site.Plantar enthesophyte noted.  Joint spaces are 
preserved. Alignment iswithin normal limits. Soft tissue swelling over the 
posterior ankle, heeland lateral midfoot. The distal Achilles tendon is 
thickened and indistinctwith edema in the pre-Achilles fat. Lucent tracksfrom 
Achilles tendonrepair are seen in the posterior calcaneus.IMPRESSIONChanges of 
prior Achilles tendon repair with distal Achilles tendonthickening and 
swelling.Soft tissue swelling over the posterior ankle, heel and lateral 
midfoot.Preliminary Report Dictated by Resident: Amy BezoldI, Deborah Stedman, MD., have reviewed this study and agree with the abovereport.[facility] FLU A AND B (MOLECULAR)2020 03:04:00





             Test Item    Value        Reference Range Interpretation Comments

 

             POCT INFLUENZA A (test neg          Negative -                



             code = 3840)              Negative                  

 

             POCT INFLUENZA B (test neg          Negative -                



             code = 3841)              Negative                  

 

             POLI (test code = POLI) accurate development                         

  



                          and interpretation of                           



                          all internal controls                           

 

             Lab Interpretation Normal                                 



             (test code = 17496-9)                                        



[facility] FLU A AND B (MOLECULAR)2020 
03:04:00





             Test Item    Value        Reference Range Interpretation Comments

 

             POCT INFLUENZA A (test neg          Negative -                



             code = 3840)              Negative                  

 

             POCT INFLUENZA B (test neg          Negative -                



             code = 3841)              Negative                  

 

             POLI (test code = POLI) accurate development                         

  



                          and interpretation of                           



                          all internal controls                           

 

             Lab Interpretation Normal                                 



             (test code = 30059-0)                                        



[facility] FLU A AND B (MOLECULAR)2020 
03:04:00





             Test Item    Value        Reference Range Interpretation Comments

 

             POCT INFLUENZA A (test neg          Negative -                



             code = 3840)              Negative                  

 

             POCT INFLUENZA B (test neg          Negative -                



             code = 3841)              Negative                  

 

             POLI (test code = POLI) accurate development                         

  



                          and interpretation of                           



                          all internal controls                           

 

             Lab Interpretation Normal                                 



             (test code = 32571-8)                                        



[facility] GRP A STREP (MOLECULAR)2020 
02:57:00





             Test Item    Value        Reference Range Interpretation Comments

 

             POCT GP A STREP (test neg          Negative -                



             code = 87972-7)              Negative                  

 

             POLI (test code = POLI) accurate development                         

  



                          and interpretation of                           



                          all internal controls                           

 

             Lab Interpretation Normal                                 



             (test code = 19379-1)                                        



[facility] GRP A STREP (MOLECULAR)2020 
02:57:00





             Test Item    Value        Reference Range Interpretation Comments

 

             POCT GP A STREP (test neg          Negative -                



             code = 15060-5)              Negative                  

 

             POLI (test code = POLI) accurate development                         

  



                          and interpretation of                           



                          all internal controls                           

 

             Lab Interpretation Normal                                 



             (test code = 69961-1)                                        



[facility] GRP A STREP (MOLECULAR)2020 
02:57:00





             Test Item    Value        Reference Range Interpretation Comments

 

             POCT GP A STREP (test neg          Negative -                



             code = 52184-3)              Negative                  

 

             POLI (test code = POLI) accurate development                         

  



                          and interpretation of                           



                          all internal controls                           

 

             Lab Interpretation Normal                                 



             (test code = 03547-7)                                        



[facility] ULTRASOUND BREAST LIMITED BILATERAL
2020 22:20:37Examination:BI ULTRASOUND BREAST LIMITED BILATERAL 
History:Patient is 39 year old and is seen for: ?Mass felt on right side of 
breast, possible fibrous tissue, please evaluate further. us ordered if needed, 
if radiologist wants after mmg is done.. ? Comparisons: 2020 BI DIAGNOSTIC
MAMMOGRAM BILATERAL HISTORY:1. Abnormal mammogram and history of palpable mass 
in right breast.2. Abnormal left breast mammogram. TECHNIQUE: Upper-outer 
quadrant of the left breast as well as right breast were evaluated in 
radial/antiradial/sagittal/coronal planes both by the technologist and by me. 
Female technologist was present in the room during all imaging evaluations. 
FINDINGS: RIGHT BREAST ULTRASOUND: Dense fibroglandular breast tissue is 
detected throughout. Hypoechoic 2.7 x 1.3 mm lesion at 12:00 is consistent with 
small benign lesion, possibly a small cyst. Technologist measured on the oval-
shaped 7.3 x2.8 mm abnormality at 12:00 which appeared to be normal 
fibroglandular tissue during my evaluation. No discrete solid mass detected by 
myself or by the technologist evaluation. LEFT BREAST ULTRASOUND: Dense 
fibroglandular tissue is detected throughout. No worrisome solid mass is 
detected. An oval-shaped 4.6 x 2.3 mm cystic lesion was noted at 12:00. Another 
2.6 mm cystic lesion at 2:00 2 cm from the nipple, 2 small cystic lesions 
located close to each other at 2:00, each measuring 2 mm and 3 mm in size, 4 cm 
from the nipple. CONCLUSIONS: No worrisome solid masses detected in upper outer 
quadrant ofthe right breast or left breast. Findings were discussed and the 
images were reviewed with the patient. Annual bilateral mammography evaluation 
is appropriate. ACR classification: Category II. Recommendation:Annual 
mammographic follow-up - Bilateral ?   BI-RADS Category: Both 2 - Benign
[facility] DIAGNOSTIC MAMMOGRAM [id number] 
22:15:23Examination:BI DIAGNOSTIC MAMMOGRAM BILATERAL History:Patient is 39 year
old and is seen for: ?Mass felt on right side of breast, possible fibrous 
tissue, please evaluate further.. Computer-aided detection (CAD) utilized. 
Comparisons : None available Findings:The breasts are heterogeneously dense, 
which may obscure small masses.  LeftThere is a focal asymmetry seen in the 
upper outer quadrant of the left breast in the posterior depth.  RightThere is a
focal asymmetry seen in the upper outer quadrantof the right breast in the 
middle depth.   Impression:Prominent asymmetrical changes in upper outer RIGHT 
as well as LEFT breast, most likely normal dense breast tissue. Ultrasound 
studies recommended to rule out any solid mass. Recommendation:Ultrasound - 
RightUltrasound - Left  BI-RADS Category: Left: 0 - Incomplete: Needs Additional
Imaging EvaluationRight: 0 - Incomplete: Needs Additional Imaging
EvaluationOverall: 0 - Incomplete: Needs Additional Imaging EvaluationUn[facility]